# Patient Record
Sex: MALE | Race: AMERICAN INDIAN OR ALASKA NATIVE | NOT HISPANIC OR LATINO | Employment: OTHER | URBAN - NONMETROPOLITAN AREA
[De-identification: names, ages, dates, MRNs, and addresses within clinical notes are randomized per-mention and may not be internally consistent; named-entity substitution may affect disease eponyms.]

---

## 2019-09-26 ENCOUNTER — OFFICE VISIT (OUTPATIENT)
Dept: MEDICAL GROUP | Facility: PHYSICIAN GROUP | Age: 79
End: 2019-09-26
Payer: MEDICARE

## 2019-09-26 VITALS
TEMPERATURE: 97 F | OXYGEN SATURATION: 98 % | DIASTOLIC BLOOD PRESSURE: 86 MMHG | BODY MASS INDEX: 35.02 KG/M2 | SYSTOLIC BLOOD PRESSURE: 120 MMHG | HEIGHT: 70 IN | HEART RATE: 60 BPM | WEIGHT: 244.6 LBS | RESPIRATION RATE: 16 BRPM

## 2019-09-26 DIAGNOSIS — I25.10 CORONARY ARTERY DISEASE INVOLVING NATIVE CORONARY ARTERY OF NATIVE HEART WITHOUT ANGINA PECTORIS: ICD-10-CM

## 2019-09-26 DIAGNOSIS — E66.9 OBESITY (BMI 35.0-39.9 WITHOUT COMORBIDITY): ICD-10-CM

## 2019-09-26 DIAGNOSIS — H40.9 GLAUCOMA OF LEFT EYE, UNSPECIFIED GLAUCOMA TYPE: ICD-10-CM

## 2019-09-26 DIAGNOSIS — H25.093 AGE-RELATED INCIPIENT CATARACT OF BOTH EYES: ICD-10-CM

## 2019-09-26 PROBLEM — H25.9 AGE-RELATED CATARACT OF LEFT EYE: Status: ACTIVE | Noted: 2019-09-26

## 2019-09-26 PROBLEM — H26.9 CATARACT OF BOTH EYES: Status: ACTIVE | Noted: 2019-09-26

## 2019-09-26 PROCEDURE — 99204 OFFICE O/P NEW MOD 45 MIN: CPT | Performed by: INTERNAL MEDICINE

## 2019-09-26 RX ORDER — ASPIRIN 81 MG/1
81 TABLET ORAL DAILY
Qty: 90 TAB | Refills: 3 | COMMUNITY
Start: 2019-09-26

## 2019-09-26 RX ORDER — METOPROLOL SUCCINATE 50 MG/1
50 TABLET, EXTENDED RELEASE ORAL DAILY
COMMUNITY
End: 2019-11-07

## 2019-09-26 RX ORDER — ATORVASTATIN CALCIUM 40 MG/1
40 TABLET, FILM COATED ORAL NIGHTLY
COMMUNITY
End: 2019-11-07 | Stop reason: SDUPTHER

## 2019-09-26 RX ORDER — VITS A,C,E/LUTEIN/MINERALS 300MCG-200
1 TABLET ORAL DAILY
COMMUNITY

## 2019-09-26 ASSESSMENT — PATIENT HEALTH QUESTIONNAIRE - PHQ9: CLINICAL INTERPRETATION OF PHQ2 SCORE: 0

## 2019-09-26 NOTE — LETTER
[employer]  Afua Solorio M.D.  [address] [facility] [address]  [facility] [address]  Fax: [phone]   Authorization for Release/Disclosure of   Protected Health Information   Name: KYLE FLEMING : 1940 SSN: [id number]   Address: [address] [place] [address]  [place] [address] Phone:    [phone] (home)    I authorize the entity listed below to release/disclose the PHI below to:   [employer]/Afua Solorio M.D. and Afua Solorio M.D.   Provider or Entity Name:     Address   City, State, Zip   Phone:      Fax:     Reason for request: continuity of care   Information to be released:    [  ] LAST COLONOSCOPY,  including any PATH REPORT and follow-up  [  ] LAST FIT/COLOGUARD RESULT [  ] LAST DEXA  [  ] LAST MAMMOGRAM  [  ] LAST PAP  [  ] LAST LABS [  ] RETINA EXAM REPORT  [  ] IMMUNIZATION RECORDS  [  ] Release all info      [  ] Check here and initial the line next to each item to release ALL health information INCLUDING  _____ Care and treatment for drug and / or alcohol abuse  _____ HIV testing, infection status, or AIDS  _____ Genetic Testing    DATES OF SERVICE OR TIME PERIOD TO BE DISCLOSED: _____________  I understand and acknowledge that:  * This Authorization may be revoked at any time by you in writing, except if your health information has already been used or disclosed.  * Your health information that will be used or disclosed as a result of you signing this authorization could be re-disclosed by the recipient. If this occurs, your re-disclosed health information may no longer be protected by State or Federal laws.  * You may refuse to sign this Authorization. Your refusal will not affect your ability to obtain treatment.  * This Authorization becomes effective upon signing and will  on (date) __________.      If no date is indicated, this Authorization will  one (1) year from the signature date.    Name: Kyle Fleming    Signature:   Date:     2019            PLEASE FAX REQUESTED RECORDS BACK TO: [phone]

## 2019-09-26 NOTE — PROGRESS NOTES
New Patient to Establish    Reason to establish: New patient to establish    Kyle Fleming is a 79 y.o. male who presents today with the following:    CC:   Chief Complaint   Patient presents with   • Establish Care     referral to Cardiology       HPI:     Cataract of both eyes  Pending eye surgery left one 010/1/2019 and right one one week after that. Seeing Dr. Rod, ophthalmology      Glaucoma of left eye  Using eye drop. Following with Dr. Rod.       Coronary artery disease involving native coronary artery of native heart without angina pectoris  CAD s/p JOSSELINE (Everolimus eluting) in March 2018. On Aspirin, Brilinta, metoprolol, statin   No chest pain, SOB, palpitation  Referral to cardiology in [place]  Request records from Dr. Red in [place], [place]      Obesity (BMI 35.0-39.9 without comorbidity) ([facility])  Body mass index is 35.1 kg/m².  Lifestyle modification          Current Outpatient Medications:   •  travoprost (TRAVATAN Z) 0.004 % Solution, Place 1 Drop in both eyes every evening., Disp: , Rfl:   •  metoprolol SR (TOPROL XL) 50 MG TABLET SR 24 HR, Take 50 mg by mouth every day., Disp: , Rfl:   •  atorvastatin (LIPITOR) 40 MG Tab, Take 40 mg by mouth every evening., Disp: , Rfl:   •  ticagrelor (BRILINTA) 90 MG Tab tablet, Take 90 mg by mouth 2 Times a Day., Disp: , Rfl:   •  Multiple Vitamins-Minerals (OCUVITE-LUTEIN) Tab, Take 1 tablet by mouth every day., Disp: , Rfl:   •  Cholecalciferol (VITAMIN D3) 3000 units Tab, Take  by mouth., Disp: , Rfl:   •  aspirin 81 MG EC tablet, Take 1 Tab by mouth every day., Disp: 90 Tab, Rfl: 3    Allergies, past medical history, past surgical history, medications, family history, social history reviewed and updated.    ROS     Constitutional: Denies fevers or chills  Eyes: Denies changes in vision  Ears/Nose/Throat/Mouth: Denies nasal congestion or sore throat   Cardiovascular: Denies chest pain or palpitations   Respiratory: Denies shortness of breath , Denies  "cough  Gastrointestinal/Hepatic: Denies abd pain, nausea, vomiting   Genitourinary: Denies dysuria or frequency  Musculoskeletal/Rheum: chronic back pain  Neurological: Denies headache  Psychiatric: Denies mood disorder   Endocrine: Denies hx of diabetes or thyroid dysfunction  Heme/Oncology/Lymph Nodes: Denies weight changes or enlarged LNs.    Physical Exam  /86 (BP Location: Left arm, Patient Position: Sitting, BP Cuff Size: Large adult)   Pulse 60   Temp 36.1 °C (97 °F) (Temporal)   Resp 16   Ht 1.778 m (5' 10\")   Wt 110.9 kg (244 lb 9.6 oz)   SpO2 98%   BMI 35.10 kg/m²   General: Normal appearance.  Well developed, well nourished, no acute distress.  HEENT: Normocephalic.  Extraocular motion intact. Pupils are equally round, reactive to light and accommodation, conjunctiva clear, no scleral icterus.  Ears: normal shape and contour, ear canals clear, tympanic membranes intact. Hearing intact.  Oropharynx clear, no erythema, edema or exudate noted.  NECK: Thyroid is not enlarged. No JVD.  No carotid bruits. No masses.  Cardiovascular: Regular rhythm and rate. No murmur/rubs/gallops.   Respiratory: Normal respiratory effort, clear to auscultation bilaterally. No wheezing/rales/rhonchi.    Abdomen: Bowel sounds present, soft, nontender, nondistended, no rebound, no guarding. No hepatosplenomegaly.  : No suprapubic tenderness. No CVA tenderness.   EXT: no LE edema b/l. No cyanosis.  No clubbing.  Lymph: No cervical, supraclavicular or axillary lymph nodes are palpable  Skin: Warm and dry.  No suspicious lesions or rashes.   Neurologic: No focal deficits. Sensation intact.    Psych: AAOx3,  Normal mood and affect, normal judgment and insight, memory within normal limits      Assessment and Plan    Kyle was seen today for establish care.    Diagnoses and all orders for this visit:    Coronary artery disease involving native coronary artery of native heart without angina pectoris  -     REFERRAL TO " CARDIOLOGY  -     CBC WITH DIFFERENTIAL; Future  -     Comp Metabolic Panel; Future  -     Lipid Profile; Future  -     TSH WITH REFLEX TO FT4; Future  Continue current regimen    Obesity (BMI 35.0-39.9 without comorbidity)  -     Patient identified as having weight management issue.  Appropriate orders and counseling given.  -     TSH WITH REFLEX TO FT4; Future    Age-related incipient cataract of both eyes  Glaucoma of left eye, unspecified glaucoma type  Follow with ophthalmology    Request records from prior PCP, cardiology, ophthalmology    Follow-up:Return in about 2 weeks (around 10/10/2019), or if symptoms worsen or fail to improve.    This note was created using voice recognition software. There may be unintended errors in spelling, grammar or content.

## 2019-09-26 NOTE — ASSESSMENT & PLAN NOTE
CAD s/p JOSSELINE (Everolimus eluting) in March 2018. On Aspirin, Brilinta, metoprolol, statin   No chest pain, SOB, palpitation  Referral to cardiology in [place]  Request records from Dr. Red in [place], [place]

## 2019-09-26 NOTE — LETTER
[employer]  Afua Solorio M.D.  [address] [facility] [address]  [facility] [address]  Fax: [phone]   Authorization for Release/Disclosure of   Protected Health Information   Name: KYLE FLEMING : 1940 SSN: [id number]   Address: [address] [place] [address]  [place] [address] Phone:    [phone] (home)    I authorize the entity listed below to release/disclose the PHI below to:   [employer]/Afua Solorio M.D. and Afua Solorio M.D.   Provider or Entity Name:     Address   City, State, Zip   Phone:      Fax:     Reason for request: continuity of care   Information to be released:    [  ] LAST COLONOSCOPY,  including any PATH REPORT and follow-up  [  ] LAST FIT/COLOGUARD RESULT [  ] LAST DEXA  [  ] LAST MAMMOGRAM  [  ] LAST PAP  [  ] LAST LABS [  ] RETINA EXAM REPORT  [  ] IMMUNIZATION RECORDS  [  ] Release all info      [  ] Check here and initial the line next to each item to release ALL health information INCLUDING  _____ Care and treatment for drug and / or alcohol abuse  _____ HIV testing, infection status, or AIDS  _____ Genetic Testing    DATES OF SERVICE OR TIME PERIOD TO BE DISCLOSED: _____________  I understand and acknowledge that:  * This Authorization may be revoked at any time by you in writing, except if your health information has already been used or disclosed.  * Your health information that will be used or disclosed as a result of you signing this authorization could be re-disclosed by the recipient. If this occurs, your re-disclosed health information may no longer be protected by State or Federal laws.  * You may refuse to sign this Authorization. Your refusal will not affect your ability to obtain treatment.  * This Authorization becomes effective upon signing and will  on (date) __________.      If no date is indicated, this Authorization will  one (1) year from the signature date.    Name: Kyle Fleming    Signature:   Date:     2019            PLEASE FAX REQUESTED RECORDS BACK TO: [phone]

## 2019-09-26 NOTE — LETTER
[employer]  Afua Solorio M.D.  [address] [facility] [address]  [facility] [address]  Fax: [phone]   Authorization for Release/Disclosure of   Protected Health Information   Name: KYLE FLEMING : 1940 SSN: [id number]   Address: [address] [place] [address]  [place] [address] Phone:    [phone] (home)    I authorize the entity listed below to release/disclose the PHI below to:   [employer]/Afua Solorio M.D. and Afua Solorio M.D.   Provider or Entity Name:     Address   City, State, Zip   Phone:      Fax:     Reason for request: continuity of care   Information to be released:    [  ] LAST COLONOSCOPY,  including any PATH REPORT and follow-up  [  ] LAST FIT/COLOGUARD RESULT [  ] LAST DEXA  [  ] LAST MAMMOGRAM  [  ] LAST PAP  [  ] LAST LABS [  ] RETINA EXAM REPORT  [  ] IMMUNIZATION RECORDS  [  ] Release all info      [  ] Check here and initial the line next to each item to release ALL health information INCLUDING  _____ Care and treatment for drug and / or alcohol abuse  _____ HIV testing, infection status, or AIDS  _____ Genetic Testing    DATES OF SERVICE OR TIME PERIOD TO BE DISCLOSED: _____________  I understand and acknowledge that:  * This Authorization may be revoked at any time by you in writing, except if your health information has already been used or disclosed.  * Your health information that will be used or disclosed as a result of you signing this authorization could be re-disclosed by the recipient. If this occurs, your re-disclosed health information may no longer be protected by State or Federal laws.  * You may refuse to sign this Authorization. Your refusal will not affect your ability to obtain treatment.  * This Authorization becomes effective upon signing and will  on (date) __________.      If no date is indicated, this Authorization will  one (1) year from the signature date.    Name: Kyle Fleming    Signature:   Date:     2019            PLEASE FAX REQUESTED RECORDS BACK TO: [phone]

## 2019-09-26 NOTE — LETTER
[employer]  Afua Solorio M.D.  [address] [facility] [address]  [facility] [address]  Fax: [phone]   Authorization for Release/Disclosure of   Protected Health Information   Name: YKLE FLEMING : 1940 SSN: [id number]   Address: [address] [place] [address]  [place] [address] Phone:    [phone] (home)    I authorize the entity listed below to release/disclose the PHI below to:   [employer]/Afua Solorio M.D. and Afua Solorio M.D.   Provider or Entity Name:     Address   City, State, Zip   Phone:      Fax:     Reason for request: continuity of care   Information to be released:    [  ] LAST COLONOSCOPY,  including any PATH REPORT and follow-up  [  ] LAST FIT/COLOGUARD RESULT [  ] LAST DEXA  [  ] LAST MAMMOGRAM  [  ] LAST PAP  [  ] LAST LABS [  ] RETINA EXAM REPORT  [  ] IMMUNIZATION RECORDS  [  ] Release all info      [  ] Check here and initial the line next to each item to release ALL health information INCLUDING  _____ Care and treatment for drug and / or alcohol abuse  _____ HIV testing, infection status, or AIDS  _____ Genetic Testing    DATES OF SERVICE OR TIME PERIOD TO BE DISCLOSED: _____________  I understand and acknowledge that:  * This Authorization may be revoked at any time by you in writing, except if your health information has already been used or disclosed.  * Your health information that will be used or disclosed as a result of you signing this authorization could be re-disclosed by the recipient. If this occurs, your re-disclosed health information may no longer be protected by State or Federal laws.  * You may refuse to sign this Authorization. Your refusal will not affect your ability to obtain treatment.  * This Authorization becomes effective upon signing and will  on (date) __________.      If no date is indicated, this Authorization will  one (1) year from the signature date.    Name: Kyle Fleming    Signature:   Date:     2019            PLEASE FAX REQUESTED RECORDS BACK TO: [phone]

## 2019-09-27 ENCOUNTER — HOSPITAL ENCOUNTER (OUTPATIENT)
Dept: LAB | Facility: MEDICAL CENTER | Age: 79
End: 2019-09-27
Attending: INTERNAL MEDICINE
Payer: MEDICARE

## 2019-09-27 DIAGNOSIS — E66.9 OBESITY (BMI 35.0-39.9 WITHOUT COMORBIDITY): ICD-10-CM

## 2019-09-27 DIAGNOSIS — I25.10 CORONARY ARTERY DISEASE INVOLVING NATIVE CORONARY ARTERY OF NATIVE HEART WITHOUT ANGINA PECTORIS: ICD-10-CM

## 2019-09-27 LAB
ALBUMIN SERPL BCP-MCNC: 4.7 G/DL (ref 3.2–4.9)
ALBUMIN/GLOB SERPL: 1.9 G/DL
ALP SERPL-CCNC: 52 U/L (ref 30–99)
ALT SERPL-CCNC: 15 U/L (ref 2–50)
ANION GAP SERPL CALC-SCNC: 10 MMOL/L (ref 0–11.9)
AST SERPL-CCNC: 18 U/L (ref 12–45)
BASOPHILS # BLD AUTO: 0.7 % (ref 0–1.8)
BASOPHILS # BLD: 0.04 K/UL (ref 0–0.12)
BILIRUB SERPL-MCNC: 0.9 MG/DL (ref 0.1–1.5)
BUN SERPL-MCNC: 19 MG/DL (ref 8–22)
CALCIUM SERPL-MCNC: 9.5 MG/DL (ref 8.5–10.5)
CHLORIDE SERPL-SCNC: 107 MMOL/L (ref 96–112)
CHOLEST SERPL-MCNC: 157 MG/DL (ref 100–199)
CO2 SERPL-SCNC: 24 MMOL/L (ref 20–33)
CREAT SERPL-MCNC: 0.95 MG/DL (ref 0.5–1.4)
EOSINOPHIL # BLD AUTO: 0.22 K/UL (ref 0–0.51)
EOSINOPHIL NFR BLD: 4 % (ref 0–6.9)
ERYTHROCYTE [DISTWIDTH] IN BLOOD BY AUTOMATED COUNT: 48.8 FL (ref 35.9–50)
FASTING STATUS PATIENT QL REPORTED: NORMAL
GLOBULIN SER CALC-MCNC: 2.5 G/DL (ref 1.9–3.5)
GLUCOSE SERPL-MCNC: 106 MG/DL (ref 65–99)
HCT VFR BLD AUTO: 43.8 % (ref 42–52)
HDLC SERPL-MCNC: 43 MG/DL
HGB BLD-MCNC: 14.3 G/DL (ref 14–18)
IMM GRANULOCYTES # BLD AUTO: 0.04 K/UL (ref 0–0.11)
IMM GRANULOCYTES NFR BLD AUTO: 0.7 % (ref 0–0.9)
LDLC SERPL CALC-MCNC: 80 MG/DL
LYMPHOCYTES # BLD AUTO: 1.49 K/UL (ref 1–4.8)
LYMPHOCYTES NFR BLD: 26.8 % (ref 22–41)
MCH RBC QN AUTO: 31.6 PG (ref 27–33)
MCHC RBC AUTO-ENTMCNC: 32.6 G/DL (ref 33.7–35.3)
MCV RBC AUTO: 96.9 FL (ref 81.4–97.8)
MONOCYTES # BLD AUTO: 0.61 K/UL (ref 0–0.85)
MONOCYTES NFR BLD AUTO: 11 % (ref 0–13.4)
NEUTROPHILS # BLD AUTO: 3.16 K/UL (ref 1.82–7.42)
NEUTROPHILS NFR BLD: 56.8 % (ref 44–72)
NRBC # BLD AUTO: 0 K/UL
NRBC BLD-RTO: 0 /100 WBC
PLATELET # BLD AUTO: 194 K/UL (ref 164–446)
PMV BLD AUTO: 11.7 FL (ref 9–12.9)
POTASSIUM SERPL-SCNC: 4.1 MMOL/L (ref 3.6–5.5)
PROT SERPL-MCNC: 7.2 G/DL (ref 6–8.2)
RBC # BLD AUTO: 4.52 M/UL (ref 4.7–6.1)
SODIUM SERPL-SCNC: 141 MMOL/L (ref 135–145)
TRIGL SERPL-MCNC: 170 MG/DL (ref 0–149)
TSH SERPL DL<=0.005 MIU/L-ACNC: 3.67 UIU/ML (ref 0.38–5.33)
WBC # BLD AUTO: 5.6 K/UL (ref 4.8–10.8)

## 2019-09-27 PROCEDURE — 36415 COLL VENOUS BLD VENIPUNCTURE: CPT

## 2019-09-27 PROCEDURE — 80053 COMPREHEN METABOLIC PANEL: CPT

## 2019-09-27 PROCEDURE — 85025 COMPLETE CBC W/AUTO DIFF WBC: CPT

## 2019-09-27 PROCEDURE — 84443 ASSAY THYROID STIM HORMONE: CPT

## 2019-09-27 PROCEDURE — 80061 LIPID PANEL: CPT

## 2019-10-11 ENCOUNTER — OFFICE VISIT (OUTPATIENT)
Dept: MEDICAL GROUP | Facility: PHYSICIAN GROUP | Age: 79
End: 2019-10-11
Payer: MEDICARE

## 2019-10-11 ENCOUNTER — TELEPHONE (OUTPATIENT)
Dept: CARDIOLOGY | Facility: MEDICAL CENTER | Age: 79
End: 2019-10-11

## 2019-10-11 ENCOUNTER — HOSPITAL ENCOUNTER (OUTPATIENT)
Facility: MEDICAL CENTER | Age: 79
End: 2019-10-11
Attending: INTERNAL MEDICINE
Payer: MEDICARE

## 2019-10-11 VITALS
TEMPERATURE: 97 F | DIASTOLIC BLOOD PRESSURE: 88 MMHG | WEIGHT: 205 LBS | HEART RATE: 87 BPM | OXYGEN SATURATION: 98 % | SYSTOLIC BLOOD PRESSURE: 145 MMHG | HEIGHT: 70 IN | BODY MASS INDEX: 29.35 KG/M2 | RESPIRATION RATE: 16 BRPM

## 2019-10-11 DIAGNOSIS — R73.9 HYPERGLYCEMIA: ICD-10-CM

## 2019-10-11 DIAGNOSIS — E78.49 OTHER HYPERLIPIDEMIA: ICD-10-CM

## 2019-10-11 DIAGNOSIS — E66.3 OVERWEIGHT: ICD-10-CM

## 2019-10-11 DIAGNOSIS — I25.10 CORONARY ARTERY DISEASE INVOLVING NATIVE CORONARY ARTERY OF NATIVE HEART WITHOUT ANGINA PECTORIS: ICD-10-CM

## 2019-10-11 DIAGNOSIS — H25.091 AGE-RELATED INCIPIENT CATARACT OF RIGHT EYE: ICD-10-CM

## 2019-10-11 DIAGNOSIS — I10 ESSENTIAL HYPERTENSION: ICD-10-CM

## 2019-10-11 PROBLEM — H40.9 GLAUCOMA OF LEFT EYE: Status: RESOLVED | Noted: 2019-09-26 | Resolved: 2019-10-11

## 2019-10-11 PROCEDURE — 36415 COLL VENOUS BLD VENIPUNCTURE: CPT

## 2019-10-11 PROCEDURE — 99214 OFFICE O/P EST MOD 30 MIN: CPT | Performed by: INTERNAL MEDICINE

## 2019-10-11 PROCEDURE — 83036 HEMOGLOBIN GLYCOSYLATED A1C: CPT

## 2019-10-11 NOTE — ASSESSMENT & PLAN NOTE
Ref. Range 9/27/2019 08:35   Glucose Latest Ref Range: 65 - 99 mg/dL 106 (H)   Lifestyle modification  Check A1c

## 2019-10-11 NOTE — TELEPHONE ENCOUNTER
I left a message for the patient regarding time change for appt w/ [facility] on 10/14/2019 at 2pm. Change made per [facility], due to hospital procedure.

## 2019-10-11 NOTE — PROGRESS NOTES
Established Patient    Kyle Fleming is a 79 y.o. male who presents today with the following:    CC:   Chief Complaint   Patient presents with   • Follow-Up     Lab results       HPI:     Age-related cataract of right eye  Pending right eye surgery. Seeing Dr. Rod, ophthalmology      Coronary artery disease involving native coronary artery of native heart without angina pectoris  CAD s/p JOSSELINE (Everolimus eluting) in March 2018. On Aspirin, Brilinta, metoprolol, statin   No chest pain, SOB, palpitation  Pending cardiology appointment  Request records from Dr. Red in [place], [place]        Essential hypertension  Not well controlled today. He will monitor BP BID and follow with us in two weeks. On metoprolol. Cardiac diet       Hyperglycemia     Ref. Range 9/27/2019 08:35   Glucose Latest Ref Range: 65 - 99 mg/dL 106 (H)   Lifestyle modification  Check A1c      Other hyperlipidemia  The 10-year ASCVD risk score (Pedro DELA CRUZ Jr., et al., 2013) is: 37.4%    Values used to calculate the score:      Age: 79 years      Sex: Male      Is Non- : No      Diabetic: No      Tobacco smoker: No      Systolic Blood Pressure: 145 mmHg      Is BP treated: Yes      HDL Cholesterol: 43 mg/dL      Total Cholesterol: 157 mg/dL  On statin  Lifestyle modification        Current Outpatient Medications   Medication Sig Dispense Refill   • travoprost (TRAVATAN Z) 0.004 % Solution Place 1 Drop in both eyes every evening.     • metoprolol SR (TOPROL XL) 50 MG TABLET SR 24 HR Take 50 mg by mouth every day.     • atorvastatin (LIPITOR) 40 MG Tab Take 40 mg by mouth every evening.     • ticagrelor (BRILINTA) 90 MG Tab tablet Take 90 mg by mouth 2 Times a Day.     • Multiple Vitamins-Minerals (OCUVITE-LUTEIN) Tab Take 1 tablet by mouth every day.     • Cholecalciferol (VITAMIN D3) 3000 units Tab Take  by mouth.     • aspirin 81 MG EC tablet Take 1 Tab by mouth every day. 90 Tab 3     No current facility-administered  "medications for this visit.        Allergies, past medical history, past surgical history, medications, family history, social history reviewed and updated.    ROS   Constitutional: Denies fevers or chills  Eyes: Denies changes in vision  Ears/Nose/Throat/Mouth: Denies nasal congestion or sore throat   Cardiovascular: Denies chest pain or palpitations   Respiratory: Denies shortness of breath , Denies cough  Gastrointestinal/Hepatic: Denies abd pain, nausea, vomiting   Genitourinary: Denies dysuria or frequency  Musculoskeletal/Rheum: Denies joint pain and swelling   Neurological: Denies headache  Psychiatric: Denies mood disorder   Endocrine: Denies hx of diabetes or thyroid dysfunction  Heme/Oncology/Lymph Nodes: Denies weight changes or enlarged LNs.    Physical Exam  Vitals: /88 (BP Location: Right arm, Patient Position: Sitting, BP Cuff Size: Large adult)   Pulse 87   Temp 36.1 °C (97 °F) (Temporal)   Resp 16   Ht 1.778 m (5' 10\")   Wt 93 kg (205 lb)   SpO2 98%   BMI 29.41 kg/m²   General: Alert, pleasant, NAD  HEENT: Normocephalic.  EOMI, no icterus or pallor.  Conjunctivae and lids normal. External ears normal. Oropharynx non-erythematous, mucous membranes moist.  Neck supple.  No thyromegaly or masses palpated.   Lymph: No cervical or supraclavicular lymphadenopathy.  Cardiovascular: Regular rate and rhythm.   Respiratory: Normal respiratory effort.  Clear to auscultation bilaterally.  Abdomen: Non-distended, soft  Skin: Warm, dry, no rashes.  Musculoskeletal: Gait is normal.  Moves all extremities well.  Extremities: No leg edema.  radial pulses 2+ symmetric.   Psych:  Affect/mood is normal, judgement is good, memory is intact, grooming is appropriate.      Labs (9/27/19) were reviewed and discussed with patients.  All questions were answered.      Assessment and Plan    Kyle was seen today for follow-up.    Diagnoses and all orders for this visit:    Coronary artery disease involving native " coronary artery of native heart without angina pectoris  Continue current regimen  Follow up with cardiology    Age-related cataract of right eye  Pending eye surgery    Overweight  Lifestyle modification    Hyperglycemia  -     HEMOGLOBIN A1C; Future  Lifestyle modification    Essential hypertension  DASH diet, monitor BP BID  Titrate BP med as appropriate    Other hyperlipidemia  statin        Follow-up:Return in about 2 weeks (around 10/25/2019), or if symptoms worsen or fail to improve.    This note was created using voice recognition software. There may be unintended errors in spelling, grammar or content.

## 2019-10-11 NOTE — ASSESSMENT & PLAN NOTE
Not well controlled today. He will monitor BP BID and follow with us in two weeks. On metoprolol. Cardiac diet

## 2019-10-11 NOTE — ASSESSMENT & PLAN NOTE
The 10-year ASCVD risk score (Pedro DELA CRUZ Jr., et al., 2013) is: 37.4%    Values used to calculate the score:      Age: 79 years      Sex: Male      Is Non- : No      Diabetic: No      Tobacco smoker: No      Systolic Blood Pressure: 145 mmHg      Is BP treated: Yes      HDL Cholesterol: 43 mg/dL      Total Cholesterol: 157 mg/dL  On statin  Lifestyle modification

## 2019-10-11 NOTE — ASSESSMENT & PLAN NOTE
CAD s/p JOSSELINE (Everolimus eluting) in March 2018. On Aspirin, Brilinta, metoprolol, statin   No chest pain, SOB, palpitation  Pending cardiology appointment  Request records from Dr. Red in [place], [place]

## 2019-10-12 LAB
EST. AVERAGE GLUCOSE BLD GHB EST-MCNC: 114 MG/DL
HBA1C MFR BLD: 5.6 % (ref 0–5.6)

## 2019-10-15 ENCOUNTER — TELEPHONE (OUTPATIENT)
Dept: MEDICAL GROUP | Facility: PHYSICIAN GROUP | Age: 79
End: 2019-10-15

## 2019-10-15 NOTE — TELEPHONE ENCOUNTER
----- Message from Afua Solorio M.D. sent at 10/14/2019  7:17 AM PDT -----  Please call patient and let patient know that her glycohemoglobin (three-month average of blood sugar) is normal.     Thanks!

## 2019-11-01 ENCOUNTER — APPOINTMENT (OUTPATIENT)
Dept: MEDICAL GROUP | Facility: PHYSICIAN GROUP | Age: 79
End: 2019-11-01
Payer: MEDICARE

## 2019-11-05 ENCOUNTER — TELEPHONE (OUTPATIENT)
Dept: CARDIOLOGY | Facility: MEDICAL CENTER | Age: 79
End: 2019-11-05

## 2019-11-05 NOTE — TELEPHONE ENCOUNTER
Spoke with pt about prior cardio, Pt stated he seen Dr Red in [facility].  Will try and get those records prior to appt.

## 2019-11-07 ENCOUNTER — OFFICE VISIT (OUTPATIENT)
Dept: CARDIOLOGY | Facility: MEDICAL CENTER | Age: 79
End: 2019-11-07
Payer: MEDICARE

## 2019-11-07 VITALS
HEIGHT: 70 IN | OXYGEN SATURATION: 96 % | SYSTOLIC BLOOD PRESSURE: 134 MMHG | HEART RATE: 82 BPM | BODY MASS INDEX: 35.82 KG/M2 | DIASTOLIC BLOOD PRESSURE: 70 MMHG | WEIGHT: 250.22 LBS

## 2019-11-07 DIAGNOSIS — I25.119 CORONARY ARTERY DISEASE WITH ANGINA PECTORIS, UNSPECIFIED VESSEL OR LESION TYPE, UNSPECIFIED WHETHER NATIVE OR TRANSPLANTED HEART (HCC): ICD-10-CM

## 2019-11-07 DIAGNOSIS — I10 ESSENTIAL HYPERTENSION: ICD-10-CM

## 2019-11-07 LAB — EKG IMPRESSION: NORMAL

## 2019-11-07 PROCEDURE — 93000 ELECTROCARDIOGRAM COMPLETE: CPT | Performed by: INTERNAL MEDICINE

## 2019-11-07 PROCEDURE — 99204 OFFICE O/P NEW MOD 45 MIN: CPT | Performed by: INTERNAL MEDICINE

## 2019-11-07 RX ORDER — NITROGLYCERIN 0.4 MG/1
0.4 TABLET SUBLINGUAL
COMMUNITY
End: 2019-11-08

## 2019-11-07 RX ORDER — ATORVASTATIN CALCIUM 80 MG/1
80 TABLET, FILM COATED ORAL DAILY
Qty: 90 TAB | Refills: 3 | Status: SHIPPED | OUTPATIENT
Start: 2019-11-07 | End: 2020-02-11 | Stop reason: SDUPTHER

## 2019-11-07 RX ORDER — KETOROLAC TROMETHAMINE 5 MG/ML
SOLUTION OPHTHALMIC
Refills: 0 | COMMUNITY
Start: 2019-10-09 | End: 2020-02-11

## 2019-11-07 NOTE — PROGRESS NOTES
CARDIOLOGY NEW PATIENT CONSULTATION    PCP: Afua Solorio M.D.    1. Coronary artery disease with angina pectoris, unspecified vessel or lesion type, unspecified whether native or transplanted heart (HCC)  PCI to mid LAD 3/20/2018 after presenting with syncope.  -Brilinta is no longer needed and was stopped  -Metoprolol is no longer needed and this was stopped by weaning  -Continue aspirin and statin; atorvastatin increased to 80 mg    2. Essential hypertension  Currently well controlled  - I stop metoprolol which may be driving some of the fatigue symptoms.  - He will see Dr. Solorio next week to assess blood pressure response as well as symptom response.  If antihypertensives are needed I would favor more conventional first-line agents like thiazides, calcium channel blockers or ACE inhibitors.      Follow up with Douglas Whittaker M.D. in 1 year     Chief Complaint   Patient presents with   • Coronary Artery Disease       History: Kyle Fleming is a 79 y.o. male with a past medical history of coronary artery disease presenting for consultation regarding cardiovascular disease.  In 2018 he had 2 syncopal episodes which are suggestive of cardiac syncope.  He underwent cardiac evaluation and ultimately received a drug-eluting stent to the mid LAD.  He has been taking Brilinta and aspirin since that time.  He is bothered by the high co-pays as well as easy bruising.  He was initially told the medication really only be needed for 1 year and wonders if it would be appropriate to stop this.  He also reports feeling out of energy lately and needs to take naps.  His wife does report a pattern of nocturnal apnea/hypopnia.  He has been under the care of Dr. Solorio regarding blood pressure with average readings around 125-130 systolic.    ROS:   All other systems reviewed and negative except as per the HPI    PE:  /70 (BP Location: Left arm, Patient Position: Sitting, BP Cuff Size: Adult)   Pulse 82   Ht 1.778 m (5'  "10\")   Wt 113.5 kg (250 lb 3.6 oz)   SpO2 96%   BMI 35.90 kg/m²   GEN: Well appearing  HEENT: Symmetric face. Anicteric sclerae. Moist mucus membranes  NECK: No JVD. No lymphadenopathy  CARDIAC: Normal PMI,  regular, normal S1, S2.   VASCULATURE: Normal carotid amplitude without bruit.   RESP: Clear to auscultation bilaterally  ABD: Soft, non-tender, non-distended  EXT: No  edema, no clubbing or cyanosis  SKIN: Warm and dry  NEURO: No gross deficits   PSYCH: Appropriate affect, participates in conversation    History reviewed. No pertinent past medical history.  Past Surgical History:   Procedure Laterality Date   • OTHER  2018    left shoulder rotator cuff repair   • LAMINOTOMY  2011    laminectomy, fixation  hardware. MRI compatible   • EYE SURGERY Left     left glaucoma and cataract surgery 10/2019     Allergies   Allergen Reactions   • Ciprofloxacin Hcl Rash     rash     Outpatient Encounter Medications as of 11/7/2019   Medication Sig Dispense Refill   • nitroglycerin (NITROSTAT) 0.4 MG SL Tab Place 0.4 mg under tongue every 5 minutes as needed for Chest Pain.     • atorvastatin (LIPITOR) 80 MG tablet Take 1 Tab by mouth every day. 90 Tab 3   • Multiple Vitamins-Minerals (OCUVITE-LUTEIN) Tab Take 1 tablet by mouth every day.     • Cholecalciferol (VITAMIN D3) 3000 units Tab Take  by mouth.     • aspirin 81 MG EC tablet Take 1 Tab by mouth every day. 90 Tab 3   • ketorolac (ACULAR) 0.5 % Solution INSTILL 1 GTT INTO AFFECTED EYE QID  0   • [DISCONTINUED] travoprost (TRAVATAN Z) 0.004 % Solution Place 1 Drop in both eyes every evening.     • [DISCONTINUED] metoprolol SR (TOPROL XL) 50 MG TABLET SR 24 HR Take 50 mg by mouth every day.     • [DISCONTINUED] atorvastatin (LIPITOR) 40 MG Tab Take 40 mg by mouth every evening.     • [DISCONTINUED] ticagrelor (BRILINTA) 90 MG Tab tablet Take 90 mg by mouth 2 Times a Day.       No facility-administered encounter medications on file as of 11/7/2019.      History " reviewed. No pertinent family history.     Social History     Socioeconomic History   • Marital status:      Spouse name: Not on file   • Number of children: Not on file   • Years of education: Not on file   • Highest education level: Not on file   Occupational History   • Not on file   Social Needs   • Financial resource strain: Not on file   • Food insecurity:     Worry: Not on file     Inability: Not on file   • Transportation needs:     Medical: Not on file     Non-medical: Not on file   Tobacco Use   • Smoking status: Never Smoker   • Smokeless tobacco: Never Used   Substance and Sexual Activity   • Alcohol use: Yes     Comment: wine   • Drug use: Never   • Sexual activity: Not Currently   Lifestyle   • Physical activity:     Days per week: Not on file     Minutes per session: Not on file   • Stress: Not on file   Relationships   • Social connections:     Talks on phone: Not on file     Gets together: Not on file     Attends Christian service: Not on file     Active member of club or organization: Not on file     Attends meetings of clubs or organizations: Not on file     Relationship status: Not on file   • Intimate partner violence:     Fear of current or ex partner: Not on file     Emotionally abused: Not on file     Physically abused: Not on file     Forced sexual activity: Not on file   Other Topics Concern   • Not on file   Social History Narrative   • Not on file       Studies  Lab Results   Component Value Date/Time    CHOLSTRLTOT 157 09/27/2019 08:35 AM    LDL 80 09/27/2019 08:35 AM    HDL 43 09/27/2019 08:35 AM    TRIGLYCERIDE 170 (H) 09/27/2019 08:35 AM       Lab Results   Component Value Date/Time    SODIUM 141 09/27/2019 08:35 AM    POTASSIUM 4.1 09/27/2019 08:35 AM    CHLORIDE 107 09/27/2019 08:35 AM    CO2 24 09/27/2019 08:35 AM    GLUCOSE 106 (H) 09/27/2019 08:35 AM    BUN 19 09/27/2019 08:35 AM    CREATININE 0.95 09/27/2019 08:35 AM     Lab Results   Component Value Date/Time     ALKPHOSPHAT 52 09/27/2019 08:35 AM    ASTSGOT 18 09/27/2019 08:35 AM    ALTSGPT 15 09/27/2019 08:35 AM    TBILIRUBIN 0.9 09/27/2019 08:35 AM        For this encounter I directly reviewed ECG tracings and medical records I agree with the interpretations in the electronic health record

## 2019-11-08 ENCOUNTER — OFFICE VISIT (OUTPATIENT)
Dept: MEDICAL GROUP | Facility: PHYSICIAN GROUP | Age: 79
End: 2019-11-08
Payer: MEDICARE

## 2019-11-08 VITALS
BODY MASS INDEX: 35.5 KG/M2 | HEIGHT: 70 IN | TEMPERATURE: 96.4 F | RESPIRATION RATE: 16 BRPM | SYSTOLIC BLOOD PRESSURE: 124 MMHG | OXYGEN SATURATION: 96 % | WEIGHT: 248 LBS | DIASTOLIC BLOOD PRESSURE: 78 MMHG | HEART RATE: 66 BPM

## 2019-11-08 DIAGNOSIS — I10 ESSENTIAL HYPERTENSION: ICD-10-CM

## 2019-11-08 DIAGNOSIS — E66.9 OBESITY (BMI 35.0-39.9 WITHOUT COMORBIDITY): ICD-10-CM

## 2019-11-08 DIAGNOSIS — Z91.89 AT RISK FOR OBSTRUCTIVE SLEEP APNEA: ICD-10-CM

## 2019-11-08 DIAGNOSIS — I25.10 CORONARY ARTERY DISEASE INVOLVING NATIVE CORONARY ARTERY OF NATIVE HEART WITHOUT ANGINA PECTORIS: ICD-10-CM

## 2019-11-08 PROBLEM — R73.9 HYPERGLYCEMIA: Status: RESOLVED | Noted: 2019-10-11 | Resolved: 2019-11-08

## 2019-11-08 PROCEDURE — 99214 OFFICE O/P EST MOD 30 MIN: CPT | Performed by: INTERNAL MEDICINE

## 2019-11-08 RX ORDER — LISINOPRIL 5 MG/1
TABLET ORAL
Refills: 0 | OUTPATIENT
Start: 2019-11-08

## 2019-11-08 RX ORDER — LISINOPRIL 5 MG/1
5 TABLET ORAL DAILY
Qty: 30 TAB | Refills: 0 | Status: SHIPPED | OUTPATIENT
Start: 2019-11-08 | End: 2020-01-06 | Stop reason: SDUPTHER

## 2019-11-08 NOTE — ASSESSMENT & PLAN NOTE
CAD s/p JOSSELINE (Everolimus eluting) in March 2018. On Aspirin, statin. Cardiology discontinued metoprolol.  Will close monitor his BP and titrate BP med.   No chest pain, SOB, palpitation  Following with [facility]ology

## 2019-11-08 NOTE — PROGRESS NOTES
Established Patient    Kyle Fleming is a 79 y.o. male who presents today with the following:    CC:   Chief Complaint   Patient presents with   • Follow-Up     BP        HPI:     Essential hypertension  Not well controlled today.  Patient is tapering off metoprolol. Will add lisinopril. He will monitor BP BID and follow with us in two weeks.      Coronary artery disease involving native coronary artery of native heart without angina pectoris  CAD s/p JOSSELINE (Everolimus eluting) in March 2018. On Aspirin, statin. Cardiology discontinued metoprolol.  Will close monitor his BP and titrate BP med.   No chest pain, SOB, palpitation  Following with [facility]ology      Obesity (BMI 35.0-39.9 without comorbidity)  Body mass index is 35.58 kg/m².  Lifestyle modification      At risk for obstructive sleep apnea  At high risk for ADRIANE. STOP BANG score 6-7.  Referral to sleep studies        Current Outpatient Medications   Medication Sig Dispense Refill   • ketorolac (ACULAR) 0.5 % Solution INSTILL 1 GTT INTO AFFECTED EYE QID  0   • atorvastatin (LIPITOR) 80 MG tablet Take 1 Tab by mouth every day. 90 Tab 3   • Multiple Vitamins-Minerals (OCUVITE-LUTEIN) Tab Take 1 tablet by mouth every day.     • Cholecalciferol (VITAMIN D3) 3000 units Tab Take  by mouth.     • aspirin 81 MG EC tablet Take 1 Tab by mouth every day. 90 Tab 3   • lisinopril (PRINIVIL) 5 MG Tab Take 1 Tab by mouth every day. 30 Tab 0     No current facility-administered medications for this visit.        Allergies, past medical history, past surgical history, medications, family history, social history reviewed and updated.    ROS   Constitutional: Denies fevers or chills  Eyes: Denies changes in vision  Ears/Nose/Throat/Mouth: Denies nasal congestion or sore throat   Cardiovascular: Denies chest pain or palpitations   Respiratory: Denies shortness of breath , Denies cough  Gastrointestinal/Hepatic: Denies abd pain, nausea, vomiting   Genitourinary: Denies  "dysuria or frequency  Musculoskeletal/Rheum: Denies joint pain and swelling   Neurological: Denies headache  Psychiatric: Denies mood disorder   Endocrine: Denies hx of diabetes or thyroid dysfunction  Heme/Oncology/Lymph Nodes: Denies weight changes or enlarged LNs.    Physical Exam  Vitals: /78 (BP Location: Left arm, Patient Position: Sitting, BP Cuff Size: Adult)   Pulse 66   Temp (!) 35.8 °C (96.4 °F) (Temporal)   Resp 16   Ht 1.778 m (5' 10\")   Wt 112.5 kg (248 lb)   SpO2 96%   BMI 35.58 kg/m²   General: Alert, pleasant, NAD  HEENT: Normocephalic.  EOMI, no icterus or pallor.  Conjunctivae and lids normal. External ears normal. Oropharynx non-erythematous, mucous membranes moist.  Neck supple.  No thyromegaly or masses palpated.   Lymph: No cervical or supraclavicular lymphadenopathy.  Cardiovascular: Regular rate and rhythm.  Respiratory: Normal respiratory effort.  Clear to auscultation bilaterally.  Abdomen: Non-distended, soft  Skin: Warm, dry, no rashes.  Musculoskeletal: Gait is normal.  Moves all extremities well.  Extremities: No leg edema.    Psych:  Affect/mood is normal, judgement is good, memory is at baseline forgetful, grooming is appropriate.      Labs (9/27/19, 10/11/19) were reviewed.  Patient notes (11/7/19) were reviewed.   All questions were answered.      Assessment and Plan    Kyle was seen today for follow-up.    Diagnoses and all orders for this visit:    Essential hypertension  Not controlled  Lisinopril added  Monitor BP BID  Low sodium diet    Coronary artery disease involving native coronary artery of native heart without angina pectoris  Aspirin, statin, per cardiology DC metoprolol.  Monitor BP closely and titrate BP accordingly    Obesity (BMI 35.0-39.9 without comorbidity)  Lifestyle modifications    At risk for obstructive sleep apnea  Referral to sleep studies    Patient is forgetful, recommend patient to have wife to come with hm next visit to further discuss " about health maintenance.     Follow-up:Return in about 2 weeks (around 11/22/2019), or if symptoms worsen or fail to improve.    This note was created using voice recognition software. There may be unintended errors in spelling, grammar or content.

## 2019-11-08 NOTE — ASSESSMENT & PLAN NOTE
Not well controlled today.  Patient is tapering off metoprolol. Will add lisinopril. He will monitor BP BID and follow with us in two weeks.

## 2019-12-09 ENCOUNTER — APPOINTMENT (OUTPATIENT)
Dept: MEDICAL GROUP | Facility: PHYSICIAN GROUP | Age: 79
End: 2019-12-09
Payer: MEDICARE

## 2020-01-06 ENCOUNTER — OFFICE VISIT (OUTPATIENT)
Dept: MEDICAL GROUP | Facility: PHYSICIAN GROUP | Age: 80
End: 2020-01-06
Payer: MEDICARE

## 2020-01-06 VITALS
DIASTOLIC BLOOD PRESSURE: 88 MMHG | OXYGEN SATURATION: 98 % | TEMPERATURE: 97.6 F | RESPIRATION RATE: 16 BRPM | SYSTOLIC BLOOD PRESSURE: 130 MMHG | BODY MASS INDEX: 35.62 KG/M2 | HEIGHT: 70 IN | WEIGHT: 248.8 LBS | HEART RATE: 76 BPM

## 2020-01-06 DIAGNOSIS — Z12.11 SCREENING FOR COLON CANCER: ICD-10-CM

## 2020-01-06 DIAGNOSIS — E78.49 OTHER HYPERLIPIDEMIA: ICD-10-CM

## 2020-01-06 DIAGNOSIS — I25.10 CORONARY ARTERY DISEASE INVOLVING NATIVE CORONARY ARTERY OF NATIVE HEART WITHOUT ANGINA PECTORIS: ICD-10-CM

## 2020-01-06 DIAGNOSIS — I10 ESSENTIAL HYPERTENSION: ICD-10-CM

## 2020-01-06 PROBLEM — H25.9 AGE-RELATED CATARACT OF RIGHT EYE: Status: RESOLVED | Noted: 2019-09-26 | Resolved: 2020-01-06

## 2020-01-06 PROCEDURE — 99214 OFFICE O/P EST MOD 30 MIN: CPT | Performed by: INTERNAL MEDICINE

## 2020-01-06 RX ORDER — LISINOPRIL 5 MG/1
5 TABLET ORAL DAILY
Qty: 30 TAB | Refills: 1 | Status: SHIPPED | OUTPATIENT
Start: 2020-01-06 | End: 2020-02-11 | Stop reason: SDUPTHER

## 2020-01-06 ASSESSMENT — PATIENT HEALTH QUESTIONNAIRE - PHQ9: CLINICAL INTERPRETATION OF PHQ2 SCORE: 0

## 2020-01-06 NOTE — PROGRESS NOTES
Established Patient    Kyle Fleming is a 79 y.o. male who presents today with the following:    CC:   Chief Complaint   Patient presents with   • Follow-Up     BP       HPI:     Essential hypertension  Uncontrolled. -150s. He did not take his lisinopril for a month. Refilled, He will monitor BP BID and follow with us in one month        Coronary artery disease involving native coronary artery of native heart without angina pectoris  CAD s/p JOSSELINE (Everolimus eluting) in March 2018. On Aspirin, statin. Cardiology discontinued metoprolol in Nov 2019.  Will close monitor his BP and titrate BP med.   No chest pain, SOB, palpitation  Following with [facility]      Screening for colon cancer  Declined colonoscopy but ok for FIT test.  Benefit and risk discussed regarding colon CA screening.         Current Outpatient Medications   Medication Sig Dispense Refill   • lisinopril (PRINIVIL) 5 MG Tab Take 1 Tab by mouth every day. 30 Tab 1   • ketorolac (ACULAR) 0.5 % Solution INSTILL 1 GTT INTO AFFECTED EYE QID  0   • atorvastatin (LIPITOR) 80 MG tablet Take 1 Tab by mouth every day. 90 Tab 3   • Multiple Vitamins-Minerals (OCUVITE-LUTEIN) Tab Take 1 tablet by mouth every day.     • Cholecalciferol (VITAMIN D3) 3000 units Tab Take  by mouth.     • aspirin 81 MG EC tablet Take 1 Tab by mouth every day. 90 Tab 3     No current facility-administered medications for this visit.        Allergies, past medical history, past surgical history, medications, family history, social history reviewed and updated.    ROS   Constitutional: Denies fevers or chills  Eyes: Denies changes in vision  Ears/Nose/Throat/Mouth: Denies nasal congestion or sore throat   Cardiovascular: Denies chest pain or palpitations   Respiratory: Denies shortness of breath , Denies cough  Gastrointestinal/Hepatic: Denies abd pain, nausea, vomiting   Genitourinary: Denies dysuria or frequency  Musculoskeletal/Rheum: Denies joint pain and swelling  "  Neurological: Denies headache  Psychiatric: Denies mood disorder   Endocrine: Denies hx of diabetes or thyroid dysfunction  Heme/Oncology/Lymph Nodes: Denies weight changes or enlarged LNs.    Physical Exam  Vitals: /88 (BP Location: Left arm, Patient Position: Sitting, BP Cuff Size: Large adult)   Pulse 76   Temp 36.4 °C (97.6 °F) (Temporal)   Resp 16   Ht 1.778 m (5' 10\")   Wt 112.9 kg (248 lb 12.8 oz)   SpO2 98%   BMI 35.70 kg/m²   General: Alert, pleasant, NAD  HEENT: Normocephalic.  EOMI, no icterus or pallor.  Conjunctivae and lids normal. External ears normal. Oropharynx non-erythematous, mucous membranes moist.  Neck supple.  No thyromegaly or masses palpated.   Lymph: No cervical or supraclavicular lymphadenopathy.  Cardiovascular: Regular rate and rhythm.    Respiratory: Normal respiratory effort.  Clear to auscultation bilaterally.  Abdomen: Non-distended, soft  Skin: Warm, dry, no rashes.  Musculoskeletal: Gait is normal.  Moves all extremities well.  Extremities: No leg edema.     Psych:  Affect/mood is normal, judgement is good, memory is intact, grooming is appropriate.      Assessment and Plan    1. Essential hypertension  - lisinopril (PRINIVIL) 5 MG Tab; Take 1 Tab by mouth every day.  Dispense: 30 Tab; Refill: 1  DASH diet  Monitor BP BID    2. Coronary artery disease involving native coronary artery of native heart without angina pectoris  Aspirin, statin, lisinopril  Follow with cardiology    3. Screening for colon cancer  - OCCULT BLOOD,FECAL,IMMUNOASSAY    4. Other hyperlipidemia  statin      Follow-up:Return in about 1 month (around 2/6/2020), or if symptoms worsen or fail to improve.    This note was created using voice recognition software. There may be unintended errors in spelling, grammar or content.    "

## 2020-01-06 NOTE — ASSESSMENT & PLAN NOTE
Declined colonoscopy but ok for FIT test.  Benefit and risk discussed regarding colon CA screening.

## 2020-01-06 NOTE — ASSESSMENT & PLAN NOTE
CAD s/p JOSSELINE (Everolimus eluting) in March 2018. On Aspirin, statin. Cardiology discontinued metoprolol in Nov 2019.  Will close monitor his BP and titrate BP med.   No chest pain, SOB, palpitation  Following with [facility]

## 2020-01-06 NOTE — ASSESSMENT & PLAN NOTE
Uncontrolled. -150s. He did not take his lisinopril for a month. Refilled, He will monitor BP BID and follow with us in one month

## 2020-01-30 ENCOUNTER — HOSPITAL ENCOUNTER (OUTPATIENT)
Facility: MEDICAL CENTER | Age: 80
End: 2020-01-30
Attending: INTERNAL MEDICINE
Payer: MEDICARE

## 2020-01-30 PROCEDURE — 82274 ASSAY TEST FOR BLOOD FECAL: CPT

## 2020-02-07 LAB — HEMOCCULT STL QL IA: NEGATIVE

## 2020-02-11 ENCOUNTER — OFFICE VISIT (OUTPATIENT)
Dept: MEDICAL GROUP | Facility: PHYSICIAN GROUP | Age: 80
End: 2020-02-11
Payer: MEDICARE

## 2020-02-11 VITALS
WEIGHT: 243.6 LBS | HEIGHT: 70 IN | BODY MASS INDEX: 34.88 KG/M2 | DIASTOLIC BLOOD PRESSURE: 80 MMHG | TEMPERATURE: 97.3 F | SYSTOLIC BLOOD PRESSURE: 122 MMHG | OXYGEN SATURATION: 97 % | RESPIRATION RATE: 16 BRPM | HEART RATE: 68 BPM

## 2020-02-11 DIAGNOSIS — I10 ESSENTIAL HYPERTENSION: ICD-10-CM

## 2020-02-11 DIAGNOSIS — E78.49 OTHER HYPERLIPIDEMIA: ICD-10-CM

## 2020-02-11 DIAGNOSIS — I25.10 CORONARY ARTERY DISEASE INVOLVING NATIVE CORONARY ARTERY OF NATIVE HEART WITHOUT ANGINA PECTORIS: ICD-10-CM

## 2020-02-11 DIAGNOSIS — I25.119 CORONARY ARTERY DISEASE WITH ANGINA PECTORIS, UNSPECIFIED VESSEL OR LESION TYPE, UNSPECIFIED WHETHER NATIVE OR TRANSPLANTED HEART (HCC): ICD-10-CM

## 2020-02-11 DIAGNOSIS — E66.9 OBESITY (BMI 35.0-39.9 WITHOUT COMORBIDITY): ICD-10-CM

## 2020-02-11 PROCEDURE — 99214 OFFICE O/P EST MOD 30 MIN: CPT | Performed by: INTERNAL MEDICINE

## 2020-02-11 RX ORDER — LISINOPRIL 5 MG/1
5 TABLET ORAL DAILY
Qty: 90 TAB | Refills: 1 | Status: SHIPPED | OUTPATIENT
Start: 2020-02-11 | End: 2020-06-26

## 2020-02-11 RX ORDER — ATORVASTATIN CALCIUM 80 MG/1
80 TABLET, FILM COATED ORAL DAILY
Qty: 90 TAB | Refills: 3 | Status: SHIPPED | OUTPATIENT
Start: 2020-02-11 | End: 2020-12-10

## 2020-02-11 RX ORDER — LISINOPRIL 5 MG/1
5 TABLET ORAL DAILY
Qty: 90 TAB | Refills: 3 | Status: CANCELLED | OUTPATIENT
Start: 2020-02-11

## 2020-02-11 RX ORDER — ATORVASTATIN CALCIUM 80 MG/1
80 TABLET, FILM COATED ORAL DAILY
Qty: 90 TAB | Refills: 3 | Status: CANCELLED | OUTPATIENT
Start: 2020-02-11

## 2020-02-11 RX ORDER — ATORVASTATIN CALCIUM 40 MG/1
TABLET, FILM COATED ORAL
COMMUNITY
Start: 2020-01-08 | End: 2020-02-11

## 2020-02-11 NOTE — ASSESSMENT & PLAN NOTE
Per patient, his BP is controlled when he takes lisinopril, however, he forgot to bring his BP log. Refilled, He will monitor BP BID and follow with us in three month

## 2020-02-11 NOTE — PROGRESS NOTES
Established Patient    Kyle Fleming is a 80 y.o. male who presents today with the following:    CC:   Chief Complaint   Patient presents with   • Follow-Up     med review       HPI:     Obesity (BMI 35.0-39.9 without comorbidity)  Body mass index is 34.95 kg/m².  Lifestyle modification      Coronary artery disease involving native coronary artery of native heart without angina pectoris  CAD s/p JOSSELINE (Everolimus eluting) in March 2018. On Aspirin, statin, lisinopril. Cardiology discontinued metoprolol in Nov 2019.  Will close monitor his BP and titrate BP med.   No chest pain, SOB, palpitation  Following with [facility]      Essential hypertension  Per patient, his BP is controlled when he takes lisinopril, however, he forgot to bring his BP log. Refilled, He will monitor BP BID and follow with us in three month      Lump  Left dorsum of medial foot lump painful  Occur 4 months ago. Getting bigger worse with pressing on it.   Accidentally dropped a brick on his left foot in mid 2019   His big toenail was initially bruised and fell off, his big toenail grew back. He notice painful small lump grew on the proximal end of injury.  Urgent referral to podiatry        Current Outpatient Medications   Medication Sig Dispense Refill   • atorvastatin (LIPITOR) 80 MG tablet Take 1 Tab by mouth every day. 90 Tab 3   • lisinopril (PRINIVIL) 5 MG Tab Take 1 Tab by mouth every day. 90 Tab 1   • Multiple Vitamins-Minerals (OCUVITE-LUTEIN) Tab Take 1 tablet by mouth every day.     • Cholecalciferol (VITAMIN D3) 3000 units Tab Take  by mouth.     • aspirin 81 MG EC tablet Take 1 Tab by mouth every day. 90 Tab 3     No current facility-administered medications for this visit.        Allergies, past medical history, past surgical history, medications, family history, social history reviewed and updated.    ROS   Constitutional: Denies fevers or chills  Eyes: Denies changes in vision  Ears/Nose/Throat/Mouth: Denies nasal  "congestion or sore throat   Cardiovascular: Denies chest pain or palpitations   Respiratory: Denies shortness of breath , Denies cough  Gastrointestinal/Hepatic: Denies abd pain, nausea, vomiting   Genitourinary: Denies dysuria or frequency  Musculoskeletal/Rheum: left foot lump pain Denies joint pain and swelling   Neurological: Denies headache  Psychiatric: Denies mood disorder   Endocrine: Denies hx of diabetes or thyroid dysfunction  Heme/Oncology/Lymph Nodes: Denies weight changes or enlarged LNs.    Physical Exam  Vitals: /80 (BP Location: Left arm, Patient Position: Sitting, BP Cuff Size: Large adult)   Pulse 68   Temp 36.3 °C (97.3 °F) (Temporal)   Resp 16   Ht 1.778 m (5' 10\")   Wt 110.5 kg (243 lb 9.6 oz)   SpO2 97%   BMI 34.95 kg/m²   General: Alert, pleasant, NAD  HEENT: Normocephalic.  EOMI, no icterus or pallor.  Conjunctivae and lids normal. External ears normal. Oropharynx non-erythematous, mucous membranes moist.  Neck supple.  No thyromegaly or masses palpated.   Lymph: No cervical or supraclavicular lymphadenopathy.  Cardiovascular: Regular rate and rhythm.    Respiratory: Normal respiratory effort.  Clear to auscultation bilaterally.  Abdomen: Non-distended, soft, non-tender  Skin: Warm, dry, no rashes.  Musculoskeletal: Gait is normal.  Moves all extremities well.  Extremities: left anterior medial foot lump with tenderness. No leg edema.  Pedal pulses 2+ symmetric.   Psych:  Affect/mood is normal, judgement is good, memory is intact, grooming is appropriate.      Labs (9/27/19) were reviewed and discussed with patients.  All questions were answered.      Assessment and Plan    1. Lump  - REFERRAL TO PODIATRY    2. Essential hypertension  DASH diet  Monitor BP  - lisinopril (PRINIVIL) 5 MG Tab; Take 1 Tab by mouth every day.  Dispense: 90 Tab; Refill: 1  - CBC WITH DIFFERENTIAL; Future  - TSH WITH REFLEX TO FT4; Future  - Comp Metabolic Panel; Future    3. Coronary artery disease " involving native coronary artery of native heart without angina pectoris  Aspirin, lisinopril  - atorvastatin (LIPITOR) 80 MG tablet; Take 1 Tab by mouth every day.  Dispense: 90 Tab; Refill: 3    4. Other hyperlipidemia  - atorvastatin (LIPITOR) 80 MG tablet; Take 1 Tab by mouth every day.  Dispense: 90 Tab; Refill: 3  - Lipid Profile; Future    5. Obesity (BMI 35.0-39.9 without comorbidity)  Lifestyle modifications    Review currently available outside records, unable to find out his vaccinations hx  Patient states he will contact his previous primary care to find out his vaccination hx    Follow-up:Return in about 3 months (around 5/11/2020), or if symptoms worsen or fail to improve.    This note was created using voice recognition software. There may be unintended errors in spelling, grammar or content.

## 2020-02-11 NOTE — ASSESSMENT & PLAN NOTE
Left dorsum of medial foot lump painful  Occur 4 months ago. Getting bigger worse with pressing on it.   Accidentally dropped a brick on his left foot in mid 2019   His big toenail was initially bruised and fell off, his big toenail grew back. He notice painful small lump grew on the proximal end of injury.  Urgent referral to podiatry

## 2020-02-11 NOTE — ASSESSMENT & PLAN NOTE
CAD s/p JOSSELINE (Everolimus eluting) in March 2018. On Aspirin, statin, lisinopril. Cardiology discontinued metoprolol in Nov 2019.  Will close monitor his BP and titrate BP med.   No chest pain, SOB, palpitation  Following with [facility]

## 2020-02-26 ENCOUNTER — HOSPITAL ENCOUNTER (OUTPATIENT)
Dept: LAB | Facility: MEDICAL CENTER | Age: 80
End: 2020-02-26
Attending: INTERNAL MEDICINE
Payer: MEDICARE

## 2020-02-26 DIAGNOSIS — E78.49 OTHER HYPERLIPIDEMIA: ICD-10-CM

## 2020-02-26 DIAGNOSIS — I10 ESSENTIAL HYPERTENSION: ICD-10-CM

## 2020-02-26 LAB
ALBUMIN SERPL BCP-MCNC: 4.4 G/DL (ref 3.2–4.9)
ALBUMIN/GLOB SERPL: 1.6 G/DL
ALP SERPL-CCNC: 53 U/L (ref 30–99)
ALT SERPL-CCNC: 23 U/L (ref 2–50)
ANION GAP SERPL CALC-SCNC: 7 MMOL/L (ref 0–11.9)
AST SERPL-CCNC: 23 U/L (ref 12–45)
BASOPHILS # BLD AUTO: 0.6 % (ref 0–1.8)
BASOPHILS # BLD: 0.03 K/UL (ref 0–0.12)
BILIRUB SERPL-MCNC: 0.6 MG/DL (ref 0.1–1.5)
BUN SERPL-MCNC: 22 MG/DL (ref 8–22)
CALCIUM SERPL-MCNC: 9.2 MG/DL (ref 8.5–10.5)
CHLORIDE SERPL-SCNC: 107 MMOL/L (ref 96–112)
CHOLEST SERPL-MCNC: 137 MG/DL (ref 100–199)
CO2 SERPL-SCNC: 25 MMOL/L (ref 20–33)
CREAT SERPL-MCNC: 0.9 MG/DL (ref 0.5–1.4)
EOSINOPHIL # BLD AUTO: 0.19 K/UL (ref 0–0.51)
EOSINOPHIL NFR BLD: 3.6 % (ref 0–6.9)
ERYTHROCYTE [DISTWIDTH] IN BLOOD BY AUTOMATED COUNT: 43.7 FL (ref 35.9–50)
GLOBULIN SER CALC-MCNC: 2.8 G/DL (ref 1.9–3.5)
GLUCOSE SERPL-MCNC: 110 MG/DL (ref 65–99)
HCT VFR BLD AUTO: 42.3 % (ref 42–52)
HDLC SERPL-MCNC: 41 MG/DL
HGB BLD-MCNC: 14.1 G/DL (ref 14–18)
IMM GRANULOCYTES # BLD AUTO: 0.02 K/UL (ref 0–0.11)
IMM GRANULOCYTES NFR BLD AUTO: 0.4 % (ref 0–0.9)
LDLC SERPL CALC-MCNC: 77 MG/DL
LYMPHOCYTES # BLD AUTO: 1.51 K/UL (ref 1–4.8)
LYMPHOCYTES NFR BLD: 28.4 % (ref 22–41)
MCH RBC QN AUTO: 31.6 PG (ref 27–33)
MCHC RBC AUTO-ENTMCNC: 33.3 G/DL (ref 33.7–35.3)
MCV RBC AUTO: 94.8 FL (ref 81.4–97.8)
MONOCYTES # BLD AUTO: 0.54 K/UL (ref 0–0.85)
MONOCYTES NFR BLD AUTO: 10.2 % (ref 0–13.4)
NEUTROPHILS # BLD AUTO: 3.02 K/UL (ref 1.82–7.42)
NEUTROPHILS NFR BLD: 56.8 % (ref 44–72)
NRBC # BLD AUTO: 0 K/UL
NRBC BLD-RTO: 0 /100 WBC
PLATELET # BLD AUTO: 177 K/UL (ref 164–446)
PMV BLD AUTO: 12.5 FL (ref 9–12.9)
POTASSIUM SERPL-SCNC: 4.3 MMOL/L (ref 3.6–5.5)
PROT SERPL-MCNC: 7.2 G/DL (ref 6–8.2)
RBC # BLD AUTO: 4.46 M/UL (ref 4.7–6.1)
SODIUM SERPL-SCNC: 139 MMOL/L (ref 135–145)
TRIGL SERPL-MCNC: 95 MG/DL (ref 0–149)
WBC # BLD AUTO: 5.3 K/UL (ref 4.8–10.8)

## 2020-02-26 PROCEDURE — 84443 ASSAY THYROID STIM HORMONE: CPT

## 2020-02-26 PROCEDURE — 36415 COLL VENOUS BLD VENIPUNCTURE: CPT

## 2020-02-26 PROCEDURE — 85025 COMPLETE CBC W/AUTO DIFF WBC: CPT

## 2020-02-26 PROCEDURE — 80061 LIPID PANEL: CPT

## 2020-02-26 PROCEDURE — 80053 COMPREHEN METABOLIC PANEL: CPT

## 2020-02-27 LAB — TSH SERPL DL<=0.005 MIU/L-ACNC: 3.19 UIU/ML (ref 0.38–5.33)

## 2020-05-14 ENCOUNTER — APPOINTMENT (OUTPATIENT)
Dept: MEDICAL GROUP | Facility: PHYSICIAN GROUP | Age: 80
End: 2020-05-14
Payer: MEDICARE

## 2020-06-03 ENCOUNTER — TELEPHONE (OUTPATIENT)
Dept: MEDICAL GROUP | Facility: PHYSICIAN GROUP | Age: 80
End: 2020-06-03

## 2020-06-03 ENCOUNTER — TELEPHONE (OUTPATIENT)
Dept: CARDIOLOGY | Facility: MEDICAL CENTER | Age: 80
End: 2020-06-03

## 2020-06-03 NOTE — TELEPHONE ENCOUNTER
BLAIR/brent    Pt's wife Maggie calling to ask for aspirin hold advice for foot bone spur surgery to be done by [facility] to be done on 6/12, outpatient surgery.       Please call Maggie

## 2020-06-03 NOTE — TELEPHONE ENCOUNTER
Patient's wife Maggie called to notify Dr. Solorio that Kyle will be undergoing out patient surgery for a bone spur.  Surgery will be performed by Dr. Piyush Song.     Cardiologist has also been notified

## 2020-06-03 NOTE — TELEPHONE ENCOUNTER
You  Douglas Whittaker M.D. 1 hour ago (11:19 AM)       Pt ok to hold aspirin for 5 days prior to bone spur surgery?     Douglas Whittaker M.D.  You 26 minutes ago (12:36 PM)       Sounds reasonable

## 2020-06-03 NOTE — TELEPHONE ENCOUNTER
Returned Maggie's call. Pt is having a 10 minute outpatient bone spur procedure on 6/12. The surgeon told her to notify our office that pt would be holding aspirin for 5 days prior. Maggie doesn't believe that any sort of clearance letter is needed. She was just told to notify our office. She said if BE has any problems with pt holding aspirin to notify her, otherwise he will hold as instructed by surgeon.    To BE

## 2020-06-25 DIAGNOSIS — I10 ESSENTIAL HYPERTENSION: ICD-10-CM

## 2020-06-26 RX ORDER — LISINOPRIL 5 MG/1
TABLET ORAL
Qty: 90 TAB | Refills: 1 | Status: SHIPPED | OUTPATIENT
Start: 2020-06-26 | End: 2020-12-10

## 2020-10-14 ENCOUNTER — NURSE TRIAGE (OUTPATIENT)
Dept: HEALTH INFORMATION MANAGEMENT | Facility: OTHER | Age: 80
End: 2020-10-14

## 2020-10-14 NOTE — TELEPHONE ENCOUNTER
Regarding: symptoms - shortness of breath , fatigue  ----- Message from Ronna Denis sent at 10/14/2020  9:19 AM PDT -----  Wife, beau calling for , stating pt has shortness of breath, has feel more tired, fatigue and dizziness.    Pt cleared for doctor visit

## 2020-10-14 NOTE — TELEPHONE ENCOUNTER
1. Caller Name: Kyle Fleming                 Call Back Number: [phone] (home)   [facility] PCP or Specialty Provider: Yes         2.  In the last two weeks, has the patient had any new or worsening symptoms (not explained by alternative diagnosis)? No.    3.  Does patient have any comoribidities? None     4.  Has the patient traveled in the last 14 days OR had any known contact with someone who is suspected or confirmed to have COVID-19?  No.    5. Disposition: Cleared by RN Triage as potential is low for COVID-19; OK to keep/schedule appointment    Note routed to [facility] Provider: [place] only.

## 2020-10-15 ENCOUNTER — APPOINTMENT (OUTPATIENT)
Dept: RADIOLOGY | Facility: IMAGING CENTER | Age: 80
End: 2020-10-15
Attending: NURSE PRACTITIONER
Payer: MEDICARE

## 2020-10-15 ENCOUNTER — OFFICE VISIT (OUTPATIENT)
Dept: MEDICAL GROUP | Facility: PHYSICIAN GROUP | Age: 80
End: 2020-10-15
Payer: MEDICARE

## 2020-10-15 ENCOUNTER — OFFICE VISIT (OUTPATIENT)
Dept: URGENT CARE | Facility: CLINIC | Age: 80
End: 2020-10-15
Payer: MEDICARE

## 2020-10-15 ENCOUNTER — HOSPITAL ENCOUNTER (OUTPATIENT)
Facility: MEDICAL CENTER | Age: 80
End: 2020-10-15
Attending: NURSE PRACTITIONER
Payer: MEDICARE

## 2020-10-15 VITALS
SYSTOLIC BLOOD PRESSURE: 130 MMHG | TEMPERATURE: 97.2 F | HEART RATE: 98 BPM | RESPIRATION RATE: 24 BRPM | DIASTOLIC BLOOD PRESSURE: 82 MMHG | BODY MASS INDEX: 39.13 KG/M2 | HEIGHT: 69 IN | WEIGHT: 264.2 LBS | OXYGEN SATURATION: 98 %

## 2020-10-15 VITALS
BODY MASS INDEX: 36.42 KG/M2 | OXYGEN SATURATION: 96 % | HEIGHT: 69 IN | HEART RATE: 100 BPM | WEIGHT: 245.9 LBS | TEMPERATURE: 97 F

## 2020-10-15 DIAGNOSIS — R05.9 COUGH: ICD-10-CM

## 2020-10-15 DIAGNOSIS — R09.89 UPPER RESPIRATORY SYMPTOM: ICD-10-CM

## 2020-10-15 DIAGNOSIS — R53.83 FATIGUE, UNSPECIFIED TYPE: ICD-10-CM

## 2020-10-15 DIAGNOSIS — R06.02 SOB (SHORTNESS OF BREATH): ICD-10-CM

## 2020-10-15 DIAGNOSIS — I25.10 CORONARY ARTERY DISEASE INVOLVING NATIVE CORONARY ARTERY OF NATIVE HEART WITHOUT ANGINA PECTORIS: ICD-10-CM

## 2020-10-15 DIAGNOSIS — B34.9 VIRAL ILLNESS: ICD-10-CM

## 2020-10-15 DIAGNOSIS — I10 ESSENTIAL HYPERTENSION: ICD-10-CM

## 2020-10-15 DIAGNOSIS — R42 DIZZINESS: ICD-10-CM

## 2020-10-15 DIAGNOSIS — U07.1 COVID-19: ICD-10-CM

## 2020-10-15 DIAGNOSIS — H61.23 BILATERAL IMPACTED CERUMEN: ICD-10-CM

## 2020-10-15 DIAGNOSIS — E78.49 OTHER HYPERLIPIDEMIA: ICD-10-CM

## 2020-10-15 DIAGNOSIS — Z91.89 AT RISK FOR OBSTRUCTIVE SLEEP APNEA: ICD-10-CM

## 2020-10-15 PROCEDURE — 99214 OFFICE O/P EST MOD 30 MIN: CPT | Performed by: NURSE PRACTITIONER

## 2020-10-15 PROCEDURE — 71046 X-RAY EXAM CHEST 2 VIEWS: CPT | Mod: TC | Performed by: NURSE PRACTITIONER

## 2020-10-15 PROCEDURE — U0003 INFECTIOUS AGENT DETECTION BY NUCLEIC ACID (DNA OR RNA); SEVERE ACUTE RESPIRATORY SYNDROME CORONAVIRUS 2 (SARS-COV-2) (CORONAVIRUS DISEASE [COVID-19]), AMPLIFIED PROBE TECHNIQUE, MAKING USE OF HIGH THROUGHPUT TECHNOLOGIES AS DESCRIBED BY CMS-2020-01-R: HCPCS

## 2020-10-15 RX ORDER — ASCORBIC ACID 500 MG
500 TABLET ORAL DAILY
COMMUNITY
End: 2021-01-01

## 2020-10-15 RX ORDER — MULTIVITAMIN WITH IRON
TABLET ORAL DAILY
COMMUNITY

## 2020-10-15 ASSESSMENT — ENCOUNTER SYMPTOMS
NAUSEA: 1
CARDIOVASCULAR NEGATIVE: 1
CONSTIPATION: 1
COUGH: 1
MYALGIAS: 1
FOCAL WEAKNESS: 0
SENSORY CHANGE: 0
HEADACHES: 1
SHORTNESS OF BREATH: 1
FEVER: 0
DIZZINESS: 1

## 2020-10-15 ASSESSMENT — FIBROSIS 4 INDEX
FIB4 SCORE: 2.11

## 2020-10-15 ASSESSMENT — VISUAL ACUITY: OU: 1

## 2020-10-15 NOTE — ASSESSMENT & PLAN NOTE
New to me. Chronic problem, well controlled on Lisinopril 5 mg . Denies CP, SOB, dizziness, HAs, peripheral edema.

## 2020-10-15 NOTE — PROGRESS NOTES
Subjective:     Kyle Fleming is a 80 y.o. male who presents for Headache, Shortness of Breath (distance walking), Dizziness (unstable when walking), Cough (dry), Nausea, and Constipation       Shortness of Breath  Associated symptoms include headaches. Pertinent negatives include no chest pain or fever.   Dizziness  Associated symptoms include coughing, headaches and myalgias. Pertinent negatives include no chest pain or fever.   Cough  Associated symptoms include headaches, myalgias and shortness of breath. Pertinent negatives include no chest pain or fever.   Nausea  Associated symptoms include coughing, headaches and myalgias. Pertinent negatives include no chest pain or fever.   Constipation  Pertinent negatives include no fever.   Headache   Associated symptoms include coughing and dizziness. Pertinent negatives include no fever.     Patient reports he was referred by his PCP for further evaluation of worsening SOB, cough, and dizziness which started about 3 weeks ago.  Patient initially went to the local ED and had a workup started including a chest x-ray.  Patient reports that they attempted to draw blood but were not able to do so.  Patient's PCP also ordered a COVID test.  However, after waiting 3 hours without seeing anyone, patient left.  He did not get the results of the chest x-ray.    Patient was screened prior to rooming and denied COVID-19 diagnosis or contact with a person who has been diagnosed or is suspected to have COVID-19. During this visit, appropriate PPE was worn, hand hygiene was performed, and the patient and any visitors were masked.     PMH:  has a past medical history of Hyperlipidemia and Hypertension.    MEDS:   Current Outpatient Medications:   •  Magnesium 250 MG Tab, Take  by mouth every day., Disp: , Rfl:   •  ascorbic acid (ASCORBIC ACID) 500 MG Tab, Take 500 mg by mouth every day., Disp: , Rfl:   •  Fexofenadine HCl (ALLERGY 24-HR PO), Take  by mouth every day., Disp: , Rfl:  "  •  lisinopril (PRINIVIL) 5 MG Tab, TAKE 1 TABLET BY MOUTH  EVERY DAY, Disp: 90 Tab, Rfl: 1  •  atorvastatin (LIPITOR) 80 MG tablet, Take 1 Tab by mouth every day., Disp: 90 Tab, Rfl: 3  •  Multiple Vitamins-Minerals (OCUVITE-LUTEIN) Tab, Take 1 tablet by mouth every day., Disp: , Rfl:   •  Cholecalciferol (VITAMIN D3) 3000 units Tab, Take  by mouth., Disp: , Rfl:   •  aspirin 81 MG EC tablet, Take 1 Tab by mouth every day., Disp: 90 Tab, Rfl: 3    ALLERGIES:   Allergies   Allergen Reactions   • Ciprofloxacin Hcl Rash     rash     SURGHX:   Past Surgical History:   Procedure Laterality Date   • EYE SURGERY Left 2019    left glaucoma and cataract surgery 10/2019   • OTHER  2018    left shoulder rotator cuff repair   • MULTIPLE CORONARY ARTERY BYPASS  2018   • LAMINOTOMY  2011    laminectomy, fixation  hardware. MRI compatible     SOCHX:  reports that he quit smoking about 36 years ago. His smoking use included cigarettes. He has never used smokeless tobacco. He reports previous alcohol use. He reports that he does not use drugs.     FH: Reviewed with patient, not pertinent to this visit.    Review of Systems   Constitutional: Positive for malaise/fatigue. Negative for fever.   HENT: Negative.    Respiratory: Positive for cough and shortness of breath.    Cardiovascular: Negative.  Negative for chest pain.   Gastrointestinal: Positive for constipation.   Musculoskeletal: Positive for myalgias.   Neurological: Positive for dizziness and headaches. Negative for sensory change and focal weakness.   All other systems reviewed and are negative.    Additional details per HPI.      Objective:     Pulse 100   Temp 36.1 °C (97 °F)   Ht 1.753 m (5' 9\")   Wt 111.5 kg (245 lb 14.4 oz)   SpO2 96%   BMI 36.31 kg/m²     Physical Exam  Vitals signs reviewed.   Constitutional:       General: He is not in acute distress.     Appearance: He is well-developed. He is not ill-appearing or toxic-appearing.   HENT:      Head: " Normocephalic.      Right Ear: External ear normal. There is impacted cerumen.      Left Ear: Tympanic membrane and external ear normal.      Nose: Nose normal.      Mouth/Throat:      Mouth: Mucous membranes are moist.      Pharynx: Oropharynx is clear. Uvula midline.   Eyes:      General: Vision grossly intact.      Extraocular Movements: Extraocular movements intact.      Conjunctiva/sclera: Conjunctivae normal.      Pupils: Pupils are equal, round, and reactive to light.   Neck:      Musculoskeletal: Normal range of motion.   Cardiovascular:      Rate and Rhythm: Normal rate and regular rhythm.      Pulses: Normal pulses.      Heart sounds: Normal heart sounds.   Pulmonary:      Effort: Pulmonary effort is normal. No respiratory distress.      Breath sounds: Normal breath sounds. No decreased breath sounds, wheezing, rhonchi or rales.   Abdominal:      General: Bowel sounds are normal.   Musculoskeletal: Normal range of motion.         General: No deformity.   Skin:     General: Skin is warm and dry.      Coloration: Skin is not pale.   Neurological:      General: No focal deficit present.      Mental Status: He is alert and oriented to person, place, and time.      Sensory: No sensory deficit.      Motor: No weakness.      Coordination: Coordination normal.   Psychiatric:         Behavior: Behavior normal. Behavior is cooperative.     Chest x-ray:    Details    Reading Physician Reading Date Result Priority   John Marshall M.D.  [phone] 10/15/2020 [facility]      Narrative & Impression        10/15/2020 3:39 PM     HISTORY/REASON FOR EXAM:  Shortness of Breath.     TECHNIQUE/EXAM DESCRIPTION AND NUMBER OF VIEWS:  Two views of the chest.     COMPARISON:  None.     FINDINGS:  The heart is normal in size.  No pulmonary infiltrates or consolidations are noted.  No pleural effusions are appreciated.  There are old left rib fractures. No pneumothorax.     IMPRESSION:     No active disease.             Last  Resulted: 10/15/20  3:51 PM          Assessment/Plan:     1. SOB (shortness of breath)  - DX-CHEST-2 VIEWS; Future    2. Cough  - COVID/SARS CoV-2 PCR; Future  - DX-CHEST-2 VIEWS; Future    3. Dizziness    4. Fatigue, unspecified type    5. Viral illness    6. Bilateral impacted cerumen    X-ray of chest ordered. Radiology report and images reviewed by myself. Negative.    Discussed likely self-limiting viral etiology and expected course and duration of illness. Vital signs stable, afebrile, no acute distress at this time.     COVID test pending. The CDC recommends that any person who has symptoms of COVID-19 self-isolates until 1) at least 10 days have elapsed since symptom onset, and 2) at least 24 hours have passed since resolution of any fever without use of fever-reducing medications, and 3) other symptoms have improved. According to the CDC, the patient may leave self-isolation once all of these criteria have been met.     Lavage performed. Patient tolerated well, no complications.    Differential diagnosis, natural history, supportive care, rest, fluids, over-the-counter symptom management per 's instructions, close monitoring, and indications for immediate follow-up discussed.     Patient advised to: Return for 1) Symptoms that worsen/don't improve, or go to ER, 2) Follow up with primary care in 7-10 days.    All questions answered. Patient agrees with the plan of care.    Discharge summary provided.

## 2020-10-15 NOTE — ASSESSMENT & PLAN NOTE
New to me.  Patient reports multiple symptoms that came on 2 weeks ago that include acute fatigue, rhinitis, sinus congestion, headache, loss of taste and smell sensation.  He is  also experiencing dizziness, , nausea w/out vomiting, and SOB on exertion which is getting worse.  He denies fevers, chills, CP, no cough or wheezing, earache, no throat pain.  He is tolerating fluids and other p.o. intake.  Patient has tried nothing for symptoms.

## 2020-10-15 NOTE — ASSESSMENT & PLAN NOTE
New to me. Chronic problem, well controlled on Lipitor 80 mg. No DM, BP is well controlled, history of 10 yrs (quit in 1984).  Most recent lipid panel is [facility] from February 2020

## 2020-10-15 NOTE — PROGRESS NOTES
Chief Complaint   Patient presents with   • Establish Care       HISTORY OF PRESENT ILLNESS: Patient is a 80 y.o. male, established patient who presents today to discuss medical problems as listed below:    Health Maintenance: pt is sick today, no vaccines administered.    Essential hypertension  New to me. Chronic problem, well controlled on Lisinopril 5 mg . Denies CP, SOB, dizziness, HAs, peripheral edema.    Other hyperlipidemia  New to me. Chronic problem, well controlled on Lipitor 80 mg. No DM, BP is well controlled, history of 10 yrs (quit in 1984).  Most recent lipid panel is [facility]NL from February 2020    At risk for obstructive sleep apnea  New to me.  Patient here with his wife.  Wife reports noting patient holding his breath and also intermittent occasional snoring.  Patient denies daytime sleepiness, fatigue or weakness.    Upper respiratory symptom  New to me.  Patient reports multiple symptoms that came on 2 weeks ago that include acute fatigue, rhinitis, sinus congestion, headache, loss of taste and smell sensation.  He is  also experiencing dizziness, , nausea w/out vomiting, and SOB on exertion which is getting worse.  He denies fevers, chills, CP, no cough or wheezing, earache, no throat pain.  He is tolerating fluids and other p.o. intake.  Patient has tried nothing for symptoms.      Patient Active Problem List    Diagnosis Date Noted   • Upper respiratory symptom 10/15/2020   • Lump 02/11/2020   • Screening for colon cancer 01/06/2020   • At risk for obstructive sleep apnea 11/08/2019   • Essential hypertension 10/11/2019   • Other hyperlipidemia 10/11/2019   • Coronary artery disease involving native coronary artery of native heart without angina pectoris 09/26/2019   • Obesity (BMI 35.0-39.9 without comorbidity) 09/26/2019        Allergies: Ciprofloxacin hcl    Current Outpatient Medications   Medication Sig Dispense Refill   • Magnesium 250 MG Tab Take  by mouth every day.     • ascorbic acid  (ASCORBIC ACID) 500 MG Tab Take 500 mg by mouth every day.     • Fexofenadine HCl (ALLERGY 24-HR PO) Take  by mouth every day.     • lisinopril (PRINIVIL) 5 MG Tab TAKE 1 TABLET BY MOUTH  EVERY DAY 90 Tab 1   • atorvastatin (LIPITOR) 80 MG tablet Take 1 Tab by mouth every day. 90 Tab 3   • Multiple Vitamins-Minerals (OCUVITE-LUTEIN) Tab Take 1 tablet by mouth every day.     • Cholecalciferol (VITAMIN D3) 3000 units Tab Take  by mouth.     • aspirin 81 MG EC tablet Take 1 Tab by mouth every day. 90 Tab 3     No current facility-administered medications for this visit.        Social History     Tobacco Use   • Smoking status: Former Smoker     Types: Cigarettes     Quit date:      Years since quittin.8   • Smokeless tobacco: Never Used   Substance Use Topics   • Alcohol use: Not Currently     Comment: wine   • Drug use: Never     Social History     Social History Narrative   • Not on file       Family History   Problem Relation Age of Onset   • Cancer Brother         lung       Allergies, past medical history, past surgical history, family history, social history reviewed and updated.    Review of Systems:     - Constitutional: fatigue. Negative for fever, chills, unexpected weight change,/generalized weakness.     - HEENT: Rhinitis, sinus congestion, headaches.  Negative for vision changes, hearing changes, ear pain, ear discharge, sore throat, and neck pain.      - Respiratory: Dyspnea on exertion.  Negative for cough, sputum production, chest congestion, dyspnea, wheezing, and crackles.      - Cardiovascular: Negative for chest pain, palpitations, orthopnea, and bilateral lower extremity edema.     - Gastrointestinal: Intermittent nausea.  Negative for heartburn, vomiting, abdominal pain, hematochezia, melena, diarrhea, constipation, and greasy/foul-smelling stools.     - Genitourinary: Negative for dysuria, polyuria, hematuria, pyuria, urinary urgency, and urinary incontinence.    - Musculoskeletal:  "Negative for myalgias, back pain, and joint pain.     - Skin: Negative for rash, itching, cyanotic skin color change.     - Neurological: Negative for dizziness, tingling, tremors, focal sensory deficit, focal weakness and headaches.     - Endo/Heme/Allergies: Does not bruise/bleed easily.     - Psychiatric/Behavioral: Negative for depression, suicidal/homicidal ideation and memory loss.      All other systems reviewed and are negative    Exam:    /82 (BP Location: Left arm, Patient Position: Sitting, BP Cuff Size: Adult)   Pulse 98   Temp 36.2 °C (97.2 °F) (Temporal)   Resp (!) 24   Ht 1.753 m (5' 9\")   Wt 119.8 kg (264 lb 3.2 oz)   SpO2 98%   BMI 39.02 kg/m²  Body mass index is 39.02 kg/m².    Physical Exam:  Constitutional: Well-developed and well-nourished. Not diaphoretic. No distress.   Skin: Pale. No rash noted.  Head: Atraumatic without lesions.  Eyes: Conjunctivae and extraocular motions are normal. Pupils are equal, round, and reactive to light. No scleral icterus.   Ears:  External ears unremarkable. Tympanic membranes clear and intact.  Nose: Nares patent. Septum midline. Turbinates without erythema nor edema. No discharge.   Mouth/Throat: Dentition is normal. Tongue normal. Oropharynx is clear and moist. Posterior pharynx without erythema or exudates.  Neck: Supple, trachea midline. Normal range of motion. No thyromegaly present. No lymphadenopathy--cervical or supraclavicular.  Cardiovascular: Regular rate and rhythm, S1 and S2 without murmur, rubs, or gallops.    Chest: Effort normal. Clear to auscultation throughout. No adventitious sounds. No CVA tenderness.  Abdomen: Soft, non tender, and without distention. Active bowel sounds in all four quadrants. No rebound, guarding, masses or HSM.  : Negative for dysuria, polyuria, hematuria, pyuria, urinary urgency, and urinary incontinence.  Extremities: No cyanosis, clubbing, erythema, nor edema. Distal pulses intact and symmetric. "   Musculoskeletal: All major joints AROM full in all directions without pain.  Neurological: Alert and oriented x 3. DTRs 2+/3 and symmetric. No cranial nerve deficit. 5/5 myotomes. Sensation intact. Negative Rhomberg.  Psychiatric:  Behavior, mood, and affect are appropriate.  MA/nursing note and vitals reviewed.    LABS: 2/26  results reviewed and discussed with the patient, questions answered.    Assessment/Plan:  1. Essential hypertension  Stable on current regimen.  Continue.  - CBC WITH DIFFERENTIAL; Future  - Comp Metabolic Panel; Future    2. Coronary artery disease involving native coronary artery of native heart without angina pectoris  Followed by [facility] cardiology.    3. Other hyperlipidemia  Stable on current regimen.  Continue.  - Lipid Profile; Future    4. COVID-19  Will review results and discuss findings with patient.  - COVID/SARS COV-2 PCR; Future    5. SOB (shortness of breath)  As discussed in #7  - COVID/SARS COV-2 PCR; Future    6. At risk for obstructive sleep apnea  - REFERRAL TO PULMONARY AND SLEEP MEDICINE Sleep Medicine    7. Upper respiratory symptom  Uncontrolled.  DDX includes COVID etiology  versus other viral URI versus cardiac etiology based on progressive unresolved nausea, dizziness and extensive cardiac history.  Based on symptoms and with concern of patient's feeling progressively worse to be evaluated in ED.  Also recommend call with testing.  Report will be given to [facility] as patient is planning on going there after this visit.  - DX-CHEST-2 VIEWS; Future       Discussed with patient possible alternative diagnoses, pt is to take all medications as prescribed. If symptoms persist FU w/PCP, if symptoms worsen go to emergency room. If experiencing any side effects from prescribed medications reports to the office immediately or go to emergency room.  Reviewed indication, dosage, usage and potential adverse effects of prescribed medications. Reviewed risks and benefits  of treatment plan. Patient verbalizes understanding of all instruction and verbally agrees to plan.    Return if symptoms worsen or fail to improve.

## 2020-10-15 NOTE — ASSESSMENT & PLAN NOTE
New to me.  Patient here with his wife.  Wife reports noting patient holding his breath and also intermittent occasional snoring.  Patient denies daytime sleepiness, fatigue or weakness.

## 2020-10-16 LAB
COVID ORDER STATUS COVID19: NORMAL
SARS-COV-2 RNA RESP QL NAA+PROBE: NOTDETECTED
SPECIMEN SOURCE: NORMAL

## 2020-10-17 NOTE — RESULT ENCOUNTER NOTE
Phoned patient to discuss results.  Spoke with the patient's wife.  Patient complaining of bilateral joint and knee pain.  Discussed likely self-limiting viral etiology and expected course and duration of illness.  Advised close monitoring, supportive measures including increasing fluids and rest, and over-the-counter symptom management per 's instructions including ibuprofen.  Follow up with PCP return to UC if no improvement in 3 days.  Return sooner or go to the ER if symptoms change or worsen.  All questions answered.

## 2020-10-20 ENCOUNTER — HOSPITAL ENCOUNTER (OUTPATIENT)
Dept: LAB | Facility: MEDICAL CENTER | Age: 80
End: 2020-10-20
Attending: NURSE PRACTITIONER
Payer: MEDICARE

## 2020-10-20 LAB
ALBUMIN SERPL BCP-MCNC: 3.4 G/DL (ref 3.2–4.9)
ALBUMIN/GLOB SERPL: 0.9 G/DL
ALP SERPL-CCNC: 500 U/L (ref 30–99)
ALT SERPL-CCNC: 902 U/L (ref 2–50)
ANION GAP SERPL CALC-SCNC: 13 MMOL/L (ref 7–16)
AST SERPL-CCNC: 838 U/L (ref 12–45)
BASOPHILS # BLD AUTO: 0.7 % (ref 0–1.8)
BASOPHILS # BLD: 0.04 K/UL (ref 0–0.12)
BILIRUB SERPL-MCNC: 4.9 MG/DL (ref 0.1–1.5)
BUN SERPL-MCNC: 15 MG/DL (ref 8–22)
CALCIUM SERPL-MCNC: 9.1 MG/DL (ref 8.5–10.5)
CHLORIDE SERPL-SCNC: 99 MMOL/L (ref 96–112)
CHOLEST SERPL-MCNC: 189 MG/DL (ref 100–199)
CO2 SERPL-SCNC: 19 MMOL/L (ref 20–33)
CREAT SERPL-MCNC: 0.7 MG/DL (ref 0.5–1.4)
EOSINOPHIL # BLD AUTO: 0.13 K/UL (ref 0–0.51)
EOSINOPHIL NFR BLD: 2.4 % (ref 0–6.9)
ERYTHROCYTE [DISTWIDTH] IN BLOOD BY AUTOMATED COUNT: 55.8 FL (ref 35.9–50)
GLOBULIN SER CALC-MCNC: 4 G/DL (ref 1.9–3.5)
GLUCOSE SERPL-MCNC: 114 MG/DL (ref 65–99)
HCT VFR BLD AUTO: 45.4 % (ref 42–52)
HDLC SERPL-MCNC: 20 MG/DL
HGB BLD-MCNC: 15.1 G/DL (ref 14–18)
IMM GRANULOCYTES # BLD AUTO: 0.03 K/UL (ref 0–0.11)
IMM GRANULOCYTES NFR BLD AUTO: 0.6 % (ref 0–0.9)
LDLC SERPL CALC-MCNC: 130 MG/DL
LYMPHOCYTES # BLD AUTO: 1.5 K/UL (ref 1–4.8)
LYMPHOCYTES NFR BLD: 27.6 % (ref 22–41)
MCH RBC QN AUTO: 31.5 PG (ref 27–33)
MCHC RBC AUTO-ENTMCNC: 33.3 G/DL (ref 33.7–35.3)
MCV RBC AUTO: 94.6 FL (ref 81.4–97.8)
MONOCYTES # BLD AUTO: 0.73 K/UL (ref 0–0.85)
MONOCYTES NFR BLD AUTO: 13.4 % (ref 0–13.4)
NEUTROPHILS # BLD AUTO: 3 K/UL (ref 1.82–7.42)
NEUTROPHILS NFR BLD: 55.3 % (ref 44–72)
NRBC # BLD AUTO: 0 K/UL
NRBC BLD-RTO: 0 /100 WBC
PLATELET # BLD AUTO: 153 K/UL (ref 164–446)
PMV BLD AUTO: 13 FL (ref 9–12.9)
POTASSIUM SERPL-SCNC: 4.3 MMOL/L (ref 3.6–5.5)
PROT SERPL-MCNC: 7.4 G/DL (ref 6–8.2)
RBC # BLD AUTO: 4.8 M/UL (ref 4.7–6.1)
SODIUM SERPL-SCNC: 131 MMOL/L (ref 135–145)
TRIGL SERPL-MCNC: 197 MG/DL (ref 0–149)
WBC # BLD AUTO: 5.4 K/UL (ref 4.8–10.8)

## 2020-10-20 PROCEDURE — 85025 COMPLETE CBC W/AUTO DIFF WBC: CPT

## 2020-10-20 PROCEDURE — 80053 COMPREHEN METABOLIC PANEL: CPT

## 2020-10-20 PROCEDURE — 36415 COLL VENOUS BLD VENIPUNCTURE: CPT

## 2020-10-20 PROCEDURE — 80061 LIPID PANEL: CPT

## 2020-10-23 ENCOUNTER — OFFICE VISIT (OUTPATIENT)
Dept: MEDICAL GROUP | Facility: PHYSICIAN GROUP | Age: 80
End: 2020-10-23
Payer: MEDICARE

## 2020-10-23 VITALS
HEIGHT: 69 IN | BODY MASS INDEX: 36.73 KG/M2 | WEIGHT: 248 LBS | HEART RATE: 91 BPM | OXYGEN SATURATION: 95 % | DIASTOLIC BLOOD PRESSURE: 90 MMHG | SYSTOLIC BLOOD PRESSURE: 120 MMHG | TEMPERATURE: 96.6 F

## 2020-10-23 DIAGNOSIS — R09.89 UPPER RESPIRATORY SYMPTOM: ICD-10-CM

## 2020-10-23 DIAGNOSIS — I10 ESSENTIAL HYPERTENSION: ICD-10-CM

## 2020-10-23 DIAGNOSIS — R94.5 ABNORMAL RESULTS OF LIVER FUNCTION STUDIES: ICD-10-CM

## 2020-10-23 DIAGNOSIS — E78.49 OTHER HYPERLIPIDEMIA: ICD-10-CM

## 2020-10-23 PROCEDURE — 99215 OFFICE O/P EST HI 40 MIN: CPT | Performed by: FAMILY MEDICINE

## 2020-10-23 RX ORDER — IBUPROFEN 800 MG/1
800 TABLET ORAL EVERY 8 HOURS PRN
Qty: 90 TAB | Refills: 1 | Status: SHIPPED | OUTPATIENT
Start: 2020-10-23 | End: 2021-02-18

## 2020-10-23 RX ORDER — A/SINGAPORE/GP1908/2015 IVR-180 (AN A/MICHIGAN/45/2015 (H1N1)PDM09-LIKE VIRUS, A/HONG KONG/4801/2014, NYMC X-263B (H3N2) (AN A/HONG KONG/4801/2014-LIKE VIRUS), AND B/BRISBANE/60/2008, WILD TYPE (A B/BRISBANE/60/2008-LIKE VIRUS) 15; 15; 15 UG/.5ML; UG/.5ML; UG/.5ML
INJECTION, SUSPENSION INTRAMUSCULAR
COMMUNITY
Start: 2020-09-19 | End: 2021-02-18

## 2020-10-23 ASSESSMENT — FIBROSIS 4 INDEX: FIB4 SCORE: 14.22

## 2020-10-23 NOTE — ASSESSMENT & PLAN NOTE
This patient has a history of hyperlipidemia for which she has been on atorvastatin 80 mg for very long time.  He is in complaining of joint pains being the remaining symptom but he absolutely denies muscle pain.  I am going to have him stop his atorvastatin because his liver functions are literally 200 times normal.  We are going to repeat studies figure out what is going on

## 2020-10-23 NOTE — ASSESSMENT & PLAN NOTE
This is a new problem for this patient where he went and had some blood work done because of his complaint of headaches and shortness of breath when he walked which then progressed to having a cough and overwhelming fatigue along with joint pain.  He is now left mainly with the joint pain which is fairly significant worse than his ever had.  He does find that the fatigue seems to be getting better.  He had already noted that he had very dark urine which is consistent with his bilirubin of 4.9.  In looking at the liver functions he had done his AST is elevated at 838 his ALT is 902 his alkaline phosphatase is 500  and his total bilirubin is 4.9

## 2020-10-23 NOTE — ASSESSMENT & PLAN NOTE
This is a chronic health problem for this patient where he is on blood pressure meds that have his blood pressure usually under excellent control.  Today is 120/90.  We will leave him on current medications.

## 2020-10-23 NOTE — ASSESSMENT & PLAN NOTE
When this patient was seen on 10/15/2020 he did admit to having had really bad headaches and then developed a cough along with fatigue and joint pain, no myalgias.  He had a Covid test on the 15th that is negative.  He has multiple other abnormalities in his blood tests that we are pursuing.

## 2020-10-26 NOTE — PROGRESS NOTES
CC:Diagnoses of Abnormal results of liver function studies, Essential hypertension, Other hyperlipidemia, and Upper respiratory symptom were pertinent to this visit.    HISTORY OF PRESENT ILLNESS: Patient is a 80 y.o. male established patient who previously saw NASIM Basilio. presents today to follow-up on blood work that was done on 10/20/2020.  The patient's wife is a retired RN and she had looked at these results and was very concerned when she saw them.  The liver functions are 200-300 times normal and the patient had been having respiratory illness symptoms so the concern was did he have corona virus.  He was checked for that and his test was negative.  He is here today to look at his results and determine next steps.      Abnormal results of liver function studies  This is a new problem for this patient where he went and had some blood work done because of his complaint of headaches and shortness of breath when he walked which then progressed to having a cough and overwhelming fatigue along with joint pain.  He is now left mainly with the joint pain which is fairly significant worse than his ever had.  He does find that the fatigue seems to be getting better.  He had already noted that he had very dark urine which is consistent with his bilirubin of 4.9.  In looking at the liver functions he had done his AST is elevated at 838 his ALT is 902 his alkaline phosphatase is 500  and his total bilirubin is 4.9    Essential hypertension  This is a chronic health problem for this patient where he is on blood pressure meds that have his blood pressure usually under excellent control.  Today is 120/90.  We will leave him on current medications.    Other hyperlipidemia  This patient has a history of hyperlipidemia for which she has been on atorvastatin 80 mg for very long time.  He is in complaining of joint pains being the remaining symptom but he absolutely denies muscle pain.  I am going to have him stop his  atorvastatin because his liver functions are literally 200 times normal.  We are going to repeat studies figure out what is going on    Upper respiratory symptom  When this patient was seen on 10/15/2020 he did admit to having had really bad headaches and then developed a cough along with fatigue and joint pain, no myalgias.  He had a Covid test on the 15th that is negative.  He has multiple other abnormalities in his blood tests that we are pursuing.      Patient Active Problem List    Diagnosis Date Noted   • Abnormal results of liver function studies 10/23/2020   • Upper respiratory symptom 10/15/2020   • Lump 02/11/2020   • Screening for colon cancer 01/06/2020   • At risk for obstructive sleep apnea 11/08/2019   • Essential hypertension 10/11/2019   • Other hyperlipidemia 10/11/2019   • Coronary artery disease involving native coronary artery of native heart without angina pectoris 09/26/2019   • Obesity (BMI 35.0-39.9 without comorbidity) 09/26/2019      Allergies:Ciprofloxacin hcl    Current Outpatient Medications   Medication Sig Dispense Refill   • ibuprofen (MOTRIN) 800 MG Tab Take 1 Tab by mouth every 8 hours as needed for Moderate Pain or Inflammation. 90 Tab 1   • Magnesium 250 MG Tab Take  by mouth every day.     • ascorbic acid (ASCORBIC ACID) 500 MG Tab Take 500 mg by mouth every day.     • Fexofenadine HCl (ALLERGY 24-HR PO) Take  by mouth every day.     • lisinopril (PRINIVIL) 5 MG Tab TAKE 1 TABLET BY MOUTH  EVERY DAY 90 Tab 1   • atorvastatin (LIPITOR) 80 MG tablet Take 1 Tab by mouth every day. 90 Tab 3   • Multiple Vitamins-Minerals (OCUVITE-LUTEIN) Tab Take 1 tablet by mouth every day.     • Cholecalciferol (VITAMIN D3) 3000 units Tab Take  by mouth.     • aspirin 81 MG EC tablet Take 1 Tab by mouth every day. 90 Tab 3   • FLUAD QUADRIVALENT 0.5 ML Prefilled Syringe PHARMACY ADMINISTERED       No current facility-administered medications for this visit.        Social History     Tobacco Use  "  • Smoking status: Former Smoker     Packs/day: 0.25     Years: 20.00     Pack years: 5.00     Types: Cigarettes     Quit date:      Years since quittin.8   • Smokeless tobacco: Never Used   Substance Use Topics   • Alcohol use: Not Currently     Comment: wine   • Drug use: Never     Social History     Social History Narrative   • Not on file       Family History   Problem Relation Age of Onset   • Other Mother         dementia   • Alcohol abuse Father    • Other Brother         dementia   • Other Sister         Alzheimer's   • Other Sister         Alzheimer's   • Cancer Brother         lung   • Cancer Brother         lung   • Cancer Brother         lung        ROS:     - Constitutional: Positive for mild fatigue but it gets better daily.    - HEENT:  Negative for vision changes, hearing changes, ear pain, ear discharge, rhinorrhea, sinus congestion, sore throat, and neck pain.      - Respiratory: Negative for cough, sputum production, chest congestion, dyspnea, wheezing, and crackles.      - Cardiovascular: Negative for chest pain, palpitations, orthopnea, and bilateral lower extremity edema.     - Gastrointestinal: Negative for heartburn, nausea, vomiting, abdominal pain, hematochezia, melena, diarrhea, constipation, and greasy/foul-smelling stools.     - Genitourinary: Negative for dysuria, polyuria, hematuria, pyuria, urinary urgency, and urinary incontinence.     - Musculoskeletal: Negative for myalgias, back pain, and joint pain.     - Skin: Negative for rash, itching, cyanotic skin color change.     - Neurological: Mild headache which is also getting better every day    - Endo/Heme/Allergies: Does not bruise/bleed easily.     - Psychiatric/Behavioral: Negative for depression, suicidal/homicidal ideation and memory loss.          - NOTE: All other systems reviewed and are negative, except as in HPI.      Exam:    /90   Pulse 91   Temp 35.9 °C (96.6 °F)   Ht 1.753 m (5' 9\")   Wt 112.5 kg " (248 lb)   SpO2 95%  Body mass index is 36.62 kg/m².    General:  Well nourished, well developed male in NAD  Head is grossly normal.  Neck: Supple without JVD or bruit. Thyroid is not enlarged.  Pulmonary: Clear to ausculation and percussion.  Normal effort. No rales, ronchi, or wheezing.  Cardiovascular: Regular rate and rhythm without murmur. Carotid and radial pulses are intact and equal bilaterally.  Extremities: no clubbing, cyanosis, or edema.  LABS: 10/20/2020: Results reviewed and discussed with the patient, questions answered.    Patient was seen for 45 minutes face to face of which, 40 minutes was spent counseling regarding his lab results and review of all of his history to try and determine next steps.  This patient needs an extensive work-up and we talked about that.      Please note that this dictation was created using voice recognition software. I have made every reasonable attempt to correct obvious errors, but I expect that there are errors of grammar and possibly content that I did not discover before finalizing the note.    Assessment/Plan:  1. Abnormal results of liver function studies  Uncontrolled, patient needs a CT scan of the abdomen and pelvis with and without as well as follow-up lab testing.  It may be that his liver functions were elevated secondary to a viral illness, but I think we need to rule out liver disease especially in light of his alkaline phosphatase being elevated.  - LDH; Future  - CREATINE KINASE; Future  - Comp Metabolic Panel; Future  - ALKALINE PHOSPHATASE ISOENZYMES; Future  - Prothrombin Time; Future  - CT-ABDOMEN WITH & W/O, PELVIS WITH; Future    2. Essential hypertension  Controlled, continue with current meds and lifestyle.      3. Other hyperlipidemia  Adequate control currently    4. Upper respiratory symptom  Resolved

## 2020-10-28 ENCOUNTER — HOSPITAL ENCOUNTER (OUTPATIENT)
Dept: LAB | Facility: MEDICAL CENTER | Age: 80
End: 2020-10-28
Attending: FAMILY MEDICINE
Payer: MEDICARE

## 2020-10-28 DIAGNOSIS — R94.5 ABNORMAL RESULTS OF LIVER FUNCTION STUDIES: ICD-10-CM

## 2020-10-28 LAB
INR PPP: 1.04 (ref 0.87–1.13)
PROTHROMBIN TIME: 13.9 SEC (ref 12–14.6)

## 2020-10-28 PROCEDURE — 84075 ASSAY ALKALINE PHOSPHATASE: CPT

## 2020-10-28 PROCEDURE — 82550 ASSAY OF CK (CPK): CPT

## 2020-10-28 PROCEDURE — 36415 COLL VENOUS BLD VENIPUNCTURE: CPT

## 2020-10-28 PROCEDURE — 80053 COMPREHEN METABOLIC PANEL: CPT

## 2020-10-28 PROCEDURE — 85610 PROTHROMBIN TIME: CPT

## 2020-10-28 PROCEDURE — 83615 LACTATE (LD) (LDH) ENZYME: CPT

## 2020-10-28 PROCEDURE — 84080 ASSAY ALKALINE PHOSPHATASES: CPT

## 2020-10-29 LAB
ALBUMIN SERPL BCP-MCNC: 3.4 G/DL (ref 3.2–4.9)
ALBUMIN/GLOB SERPL: 0.9 G/DL
ALP SERPL-CCNC: 348 U/L (ref 30–99)
ALT SERPL-CCNC: 426 U/L (ref 2–50)
ANION GAP SERPL CALC-SCNC: 11 MMOL/L (ref 7–16)
AST SERPL-CCNC: 451 U/L (ref 12–45)
BILIRUB SERPL-MCNC: 3.1 MG/DL (ref 0.1–1.5)
BUN SERPL-MCNC: 15 MG/DL (ref 8–22)
CALCIUM SERPL-MCNC: 8.6 MG/DL (ref 8.5–10.5)
CHLORIDE SERPL-SCNC: 104 MMOL/L (ref 96–112)
CK SERPL-CCNC: 76 U/L (ref 0–154)
CO2 SERPL-SCNC: 22 MMOL/L (ref 20–33)
CREAT SERPL-MCNC: 0.78 MG/DL (ref 0.5–1.4)
GLOBULIN SER CALC-MCNC: 3.9 G/DL (ref 1.9–3.5)
GLUCOSE SERPL-MCNC: 113 MG/DL (ref 65–99)
LDH SERPL L TO P-CCNC: 380 U/L (ref 107–266)
POTASSIUM SERPL-SCNC: 4.2 MMOL/L (ref 3.6–5.5)
PROT SERPL-MCNC: 7.3 G/DL (ref 6–8.2)
SODIUM SERPL-SCNC: 137 MMOL/L (ref 135–145)

## 2020-10-31 LAB
ALP BONE SERPL-CCNC: 71 U/L (ref 0–55)
ALP ISOS SERPL HS-CCNC: 0 U/L
ALP LIVER SERPL-CCNC: 303 U/L (ref 0–94)
ALP SERPL-CCNC: 374 U/L (ref 40–120)

## 2020-11-02 ENCOUNTER — HOSPITAL ENCOUNTER (OUTPATIENT)
Dept: RADIOLOGY | Facility: MEDICAL CENTER | Age: 80
End: 2020-11-02
Attending: FAMILY MEDICINE
Payer: MEDICARE

## 2020-11-02 ENCOUNTER — APPOINTMENT (OUTPATIENT)
Dept: RADIOLOGY | Facility: MEDICAL CENTER | Age: 80
End: 2020-11-02
Attending: FAMILY MEDICINE
Payer: COMMERCIAL

## 2020-11-02 DIAGNOSIS — R94.5 ABNORMAL RESULTS OF LIVER FUNCTION STUDIES: ICD-10-CM

## 2020-11-02 PROCEDURE — 74178 CT ABD&PLV WO CNTR FLWD CNTR: CPT

## 2020-11-02 PROCEDURE — 700117 HCHG RX CONTRAST REV CODE 255

## 2020-11-02 RX ADMIN — IOHEXOL 25 ML: 240 INJECTION, SOLUTION INTRATHECAL; INTRAVASCULAR; INTRAVENOUS; ORAL at 15:00

## 2020-11-02 RX ADMIN — IOHEXOL 100 ML: 350 INJECTION, SOLUTION INTRAVENOUS at 15:00

## 2020-11-03 RX ORDER — DEXAMETHASONE SODIUM PHOSPHATE 4 MG/ML
6 INJECTION, SOLUTION INTRA-ARTICULAR; INTRALESIONAL; INTRAMUSCULAR; INTRAVENOUS; SOFT TISSUE DAILY
Qty: 21 ML | Refills: 0 | Status: SHIPPED | OUTPATIENT
Start: 2020-11-03 | End: 2020-11-05

## 2020-11-05 RX ORDER — DEXAMETHASONE 4 MG/1
6 TABLET ORAL DAILY
Qty: 21 TAB | Refills: 0 | Status: SHIPPED | OUTPATIENT
Start: 2020-11-05 | End: 2020-11-19

## 2020-11-23 DIAGNOSIS — R94.5 ABNORMAL RESULTS OF LIVER FUNCTION STUDIES: ICD-10-CM

## 2020-11-24 ENCOUNTER — HOSPITAL ENCOUNTER (OUTPATIENT)
Dept: LAB | Facility: MEDICAL CENTER | Age: 80
End: 2020-11-24
Attending: FAMILY MEDICINE
Payer: MEDICARE

## 2020-11-24 DIAGNOSIS — R94.5 ABNORMAL RESULTS OF LIVER FUNCTION STUDIES: ICD-10-CM

## 2020-11-24 LAB
ALBUMIN SERPL BCP-MCNC: 3.5 G/DL (ref 3.2–4.9)
ALBUMIN/GLOB SERPL: 1.2 G/DL
ALP SERPL-CCNC: 91 U/L (ref 30–99)
ALT SERPL-CCNC: 43 U/L (ref 2–50)
ANION GAP SERPL CALC-SCNC: 8 MMOL/L (ref 7–16)
AST SERPL-CCNC: 40 U/L (ref 12–45)
BILIRUB SERPL-MCNC: 1.2 MG/DL (ref 0.1–1.5)
BUN SERPL-MCNC: 14 MG/DL (ref 8–22)
CALCIUM SERPL-MCNC: 8.4 MG/DL (ref 8.5–10.5)
CHLORIDE SERPL-SCNC: 107 MMOL/L (ref 96–112)
CO2 SERPL-SCNC: 22 MMOL/L (ref 20–33)
CREAT SERPL-MCNC: 0.79 MG/DL (ref 0.5–1.4)
GLOBULIN SER CALC-MCNC: 2.9 G/DL (ref 1.9–3.5)
GLUCOSE SERPL-MCNC: 117 MG/DL (ref 65–99)
POTASSIUM SERPL-SCNC: 4.5 MMOL/L (ref 3.6–5.5)
PROT SERPL-MCNC: 6.4 G/DL (ref 6–8.2)
SODIUM SERPL-SCNC: 137 MMOL/L (ref 135–145)

## 2020-11-24 PROCEDURE — 80053 COMPREHEN METABOLIC PANEL: CPT

## 2020-11-24 PROCEDURE — 36415 COLL VENOUS BLD VENIPUNCTURE: CPT

## 2020-11-25 ENCOUNTER — TELEMEDICINE (OUTPATIENT)
Dept: MEDICAL GROUP | Facility: PHYSICIAN GROUP | Age: 80
End: 2020-11-25
Payer: MEDICARE

## 2020-11-25 VITALS — WEIGHT: 248 LBS | BODY MASS INDEX: 36.73 KG/M2 | HEIGHT: 69 IN | RESPIRATION RATE: 20 BRPM | HEART RATE: 90 BPM

## 2020-11-25 DIAGNOSIS — R94.5 ABNORMAL RESULTS OF LIVER FUNCTION STUDIES: ICD-10-CM

## 2020-11-25 DIAGNOSIS — U07.1 COVID-19: ICD-10-CM

## 2020-11-25 PROCEDURE — 99214 OFFICE O/P EST MOD 30 MIN: CPT | Mod: 95,CR | Performed by: FAMILY MEDICINE

## 2020-11-25 RX ORDER — DEXAMETHASONE 4 MG/1
6 TABLET ORAL DAILY
Qty: 15 TAB | Refills: 0 | Status: SHIPPED
Start: 2020-11-25 | End: 2020-12-05

## 2020-11-25 ASSESSMENT — FIBROSIS 4 INDEX: FIB4 SCORE: 3.11

## 2020-11-25 NOTE — ASSESSMENT & PLAN NOTE
This patient was ill with fever, chills and shortness of breath on 1015/20 and saw Marylou.  Was sent to the emergency room at [facility] to have a COVID-19 test which came back negative on the rapid test.  I believe that was a false negative.  I then saw him on the 23rd and he was continuing to have abnormal labs with elevated liver functions the same as someone who has a viral infection.  He continues to this day feeling short of breath, having a dry cough along with myalgias and arthralgias and has lost his sense of taste and smell.  After talking with he and his wife we decided were going to treat him as a Covid positive patient.  We put him on dexamethasone 6 mg daily for 10 days, zinc 50 mg daily, vitamin D 5000 units daily and vitamin C 500 mg daily.  I have asked his wife to text me daily to let me know how he is doing.  They are also going to  an O2 sat monitor so that we can be certain that he is keeping his oxygen level above 90%.

## 2020-11-25 NOTE — PROGRESS NOTES
This evaluation was conducted via Zoom using secure and encrypted videoconferencing technology. The patient was in a private location in the [facility].    The patient's identity was confirmed and verbal consent was obtained for this virtual visit.      CC:Diagnoses of COVID-19 and Abnormal results of liver function studies were pertinent to this visit.    HISTORY OF PRESENT ILLNESS: Patient is a 80 y.o. male established patient who presents today to talk about his previously abnormal.      COVID-19  This patient was ill with fever, chills and shortness of breath on 1015/20 and saw Marylou.  Was sent to the emergency room at [facility] to have a COVID-19 test which came back negative on the rapid test.  I believe that was a false negative.  I then saw him on the 23rd and he was continuing to have abnormal labs with elevated liver functions the same as someone who has a viral infection.  He continues to this day feeling short of breath, having a dry cough along with myalgias and arthralgias and has lost his sense of taste and smell.  After talking with he and his wife we decided were going to treat him as a Covid positive patient.  We put him on dexamethasone 6 mg daily for 10 days, zinc 50 mg daily, vitamin D 5000 units daily and vitamin C 500 mg daily.  I have asked his wife to text me daily to let me know how he is doing.  They are also going to  an O2 sat monitor so that we can be certain that he is keeping his oxygen level above 90%.    Abnormal results of liver function studies  This was a problem for the patient back on 10/15 and 10/23.  He just did new blood work for me as of 11/24/2020 and his liver functions are all in the normal range.  His blood sugar slightly elevated at 117, his wife states that he has not had that previously.  We will follow and work that up      Patient Active Problem List    Diagnosis Date Noted   • COVID-19 11/25/2020   • Abnormal results of liver function studies  10/23/2020   • Upper respiratory symptom 10/15/2020   • Lump 2020   • Screening for colon cancer 2020   • At risk for obstructive sleep apnea 2019   • Essential hypertension 10/11/2019   • Other hyperlipidemia 10/11/2019   • Coronary artery disease involving native coronary artery of native heart without angina pectoris 2019   • Obesity (BMI 35.0-39.9 without comorbidity) 2019      Allergies:Ciprofloxacin hcl    Current Outpatient Medications   Medication Sig Dispense Refill   • dexamethasone (DECADRON) 4 MG Tab Take 1.5 Tabs by mouth every day for 10 days. 15 Tab 0   • FLUAD QUADRIVALENT 0.5 ML Prefilled Syringe PHARMACY ADMINISTERED     • ibuprofen (MOTRIN) 800 MG Tab Take 1 Tab by mouth every 8 hours as needed for Moderate Pain or Inflammation. 90 Tab 1   • Magnesium 250 MG Tab Take  by mouth every day.     • ascorbic acid (ASCORBIC ACID) 500 MG Tab Take 500 mg by mouth every day.     • Fexofenadine HCl (ALLERGY 24-HR PO) Take  by mouth every day.     • lisinopril (PRINIVIL) 5 MG Tab TAKE 1 TABLET BY MOUTH  EVERY DAY 90 Tab 1   • atorvastatin (LIPITOR) 80 MG tablet Take 1 Tab by mouth every day. 90 Tab 3   • Multiple Vitamins-Minerals (OCUVITE-LUTEIN) Tab Take 1 tablet by mouth every day.     • Cholecalciferol (VITAMIN D3) 3000 units Tab Take  by mouth.     • aspirin 81 MG EC tablet Take 1 Tab by mouth every day. 90 Tab 3     No current facility-administered medications for this visit.        Social History     Tobacco Use   • Smoking status: Former Smoker     Packs/day: 0.25     Years: 20.00     Pack years: 5.00     Types: Cigarettes     Quit date:      Years since quittin.9   • Smokeless tobacco: Never Used   Substance Use Topics   • Alcohol use: Not Currently     Comment: wine   • Drug use: Never     Social History     Social History Narrative   • Not on file       Family History   Problem Relation Age of Onset   • Other Mother         dementia   • Alcohol abuse Father   "  • Other Brother         dementia   • Other Sister         Alzheimer's   • Other Sister         Alzheimer's   • Cancer Brother         lung   • Cancer Brother         lung   • Cancer Brother         lung        ROS:     - Constitutional: Positive for fatigue and malaise along with generalized weakness.    - HEENT: Positive for headache..      - Respiratory:Positive for dry cough and SOB with activity.      - Cardiovascular: Negative for chest pain, palpitations, orthopnea, and bilateral lower extremity edema.     - Gastrointestinal: Negative for heartburn, nausea, vomiting, abdominal pain, hematochezia, melena, diarrhea, constipation, and greasy/foul-smelling stools.     - Genitourinary: Negative for dysuria, polyuria, hematuria, pyuria, urinary urgency, and urinary incontinence.     - Musculoskeletal:Positive for Myalgias and Arthralgias generalized.     - Skin: Negative for rash, itching, cyanotic skin color change.     - Neurological: Negative for dizziness, tingling, tremors, focal sensory deficit, focal weakness and headaches.     - Endo/Heme/Allergies: Does not bruise/bleed easily.     - Psychiatric/Behavioral: Negative for depression, suicidal/homicidal ideation and memory loss.          - NOTE: All other systems reviewed and are negative, except as in HPI.      Exam:    Pulse 90   Resp 20   Ht 1.753 m (5' 9\")   Wt 112.5 kg (248 lb)  Body mass index is 36.62 kg/m².    General:  Well nourished, well developed male in NAD  Head is grossly normal.  Patient was seen for 25 minutes face to face of which, 20 minutes was spent counseling regarding discussion of symptoms and my belief that he has Covid.  Treatment options and how to monitor his symptoms..    LABS: 11/24/2020: Results reviewed and discussed with the patient, questions answered.    Please note that this dictation was created using voice recognition software. I have made every reasonable attempt to correct obvious errors, but I expect that there are " errors of grammar and possibly content that I did not discover before finalizing the note.    Assessment/Plan:  1. COVID-19  Uncontrolled, working on rest and hydration as well as meds and supplements as above.      2. Abnormal results of liver function studies  Resolved

## 2020-11-25 NOTE — ASSESSMENT & PLAN NOTE
This was a problem for the patient back on 10/15 and 10/23.  He just did new blood work for me as of 11/24/2020 and his liver functions are all in the normal range.  His blood sugar slightly elevated at 117, his wife states that he has not had that previously.  We will follow and work that up

## 2020-12-09 DIAGNOSIS — I25.10 CORONARY ARTERY DISEASE INVOLVING NATIVE CORONARY ARTERY OF NATIVE HEART WITHOUT ANGINA PECTORIS: ICD-10-CM

## 2020-12-09 DIAGNOSIS — I10 ESSENTIAL HYPERTENSION: ICD-10-CM

## 2020-12-09 NOTE — TELEPHONE ENCOUNTER
Received request via: Pharmacy    Was the patient seen in the last year in this department? Yes    Does the patient have an active prescription (recently filled or refills available) for medication(s) requested? requesting 1 year supply

## 2020-12-10 RX ORDER — LISINOPRIL 5 MG/1
TABLET ORAL
Qty: 90 TAB | Refills: 3 | Status: SHIPPED | OUTPATIENT
Start: 2020-12-10 | End: 2021-01-01 | Stop reason: SDUPTHER

## 2020-12-10 RX ORDER — ATORVASTATIN CALCIUM 80 MG/1
TABLET, FILM COATED ORAL
Qty: 90 TAB | Refills: 3 | Status: SHIPPED | OUTPATIENT
Start: 2020-12-10 | End: 2021-01-01 | Stop reason: SDUPTHER

## 2020-12-16 ENCOUNTER — PATIENT MESSAGE (OUTPATIENT)
Dept: MEDICAL GROUP | Facility: PHYSICIAN GROUP | Age: 80
End: 2020-12-16

## 2020-12-16 DIAGNOSIS — E78.49 OTHER HYPERLIPIDEMIA: ICD-10-CM

## 2020-12-17 ENCOUNTER — HOSPITAL ENCOUNTER (OUTPATIENT)
Dept: LAB | Facility: MEDICAL CENTER | Age: 80
End: 2020-12-17
Attending: FAMILY MEDICINE
Payer: MEDICARE

## 2020-12-17 DIAGNOSIS — E78.49 OTHER HYPERLIPIDEMIA: ICD-10-CM

## 2020-12-17 LAB
ALBUMIN SERPL BCP-MCNC: 3.6 G/DL (ref 3.2–4.9)
ALBUMIN/GLOB SERPL: 1.2 G/DL
ALP SERPL-CCNC: 62 U/L (ref 30–99)
ALT SERPL-CCNC: 28 U/L (ref 2–50)
ANION GAP SERPL CALC-SCNC: 10 MMOL/L (ref 7–16)
AST SERPL-CCNC: 29 U/L (ref 12–45)
BILIRUB SERPL-MCNC: 0.7 MG/DL (ref 0.1–1.5)
BUN SERPL-MCNC: 18 MG/DL (ref 8–22)
CALCIUM SERPL-MCNC: 8.8 MG/DL (ref 8.5–10.5)
CHLORIDE SERPL-SCNC: 110 MMOL/L (ref 96–112)
CHOLEST SERPL-MCNC: 263 MG/DL (ref 100–199)
CO2 SERPL-SCNC: 23 MMOL/L (ref 20–33)
CREAT SERPL-MCNC: 0.73 MG/DL (ref 0.5–1.4)
FASTING STATUS PATIENT QL REPORTED: NORMAL
GLOBULIN SER CALC-MCNC: 3 G/DL (ref 1.9–3.5)
GLUCOSE SERPL-MCNC: 119 MG/DL (ref 65–99)
HDLC SERPL-MCNC: 50 MG/DL
LDLC SERPL CALC-MCNC: 151 MG/DL
POTASSIUM SERPL-SCNC: 4.4 MMOL/L (ref 3.6–5.5)
PROT SERPL-MCNC: 6.6 G/DL (ref 6–8.2)
SODIUM SERPL-SCNC: 143 MMOL/L (ref 135–145)
TRIGL SERPL-MCNC: 309 MG/DL (ref 0–149)

## 2020-12-17 PROCEDURE — 36415 COLL VENOUS BLD VENIPUNCTURE: CPT

## 2020-12-17 PROCEDURE — 80061 LIPID PANEL: CPT

## 2020-12-17 PROCEDURE — 80053 COMPREHEN METABOLIC PANEL: CPT

## 2021-01-01 ENCOUNTER — OFFICE VISIT (OUTPATIENT)
Dept: URGENT CARE | Facility: CLINIC | Age: 81
End: 2021-01-01
Payer: MEDICARE

## 2021-01-01 ENCOUNTER — APPOINTMENT (OUTPATIENT)
Dept: URGENT CARE | Facility: CLINIC | Age: 81
End: 2021-01-01
Payer: MEDICARE

## 2021-01-01 ENCOUNTER — OFFICE VISIT (OUTPATIENT)
Dept: HEMATOLOGY ONCOLOGY | Facility: MEDICAL CENTER | Age: 81
End: 2021-01-01
Payer: MEDICARE

## 2021-01-01 ENCOUNTER — OFFICE VISIT (OUTPATIENT)
Dept: CARDIOLOGY | Facility: MEDICAL CENTER | Age: 81
End: 2021-01-01
Payer: MEDICARE

## 2021-01-01 ENCOUNTER — IMMUNIZATION (OUTPATIENT)
Dept: FAMILY PLANNING/WOMEN'S HEALTH CLINIC | Facility: IMMUNIZATION CENTER | Age: 81
End: 2021-01-01
Attending: INTERNAL MEDICINE
Payer: MEDICARE

## 2021-01-01 ENCOUNTER — OFFICE VISIT (OUTPATIENT)
Dept: MEDICAL GROUP | Facility: MEDICAL CENTER | Age: 81
End: 2021-01-01
Payer: MEDICARE

## 2021-01-01 ENCOUNTER — HOSPITAL ENCOUNTER (OUTPATIENT)
Dept: RADIOLOGY | Facility: MEDICAL CENTER | Age: 81
End: 2021-07-23
Attending: INTERNAL MEDICINE
Payer: MEDICARE

## 2021-01-01 ENCOUNTER — HOSPITAL ENCOUNTER (OUTPATIENT)
Dept: CARDIOLOGY | Facility: MEDICAL CENTER | Age: 81
End: 2021-04-01
Attending: INTERNAL MEDICINE
Payer: MEDICARE

## 2021-01-01 ENCOUNTER — HOSPITAL ENCOUNTER (OUTPATIENT)
Dept: LAB | Facility: MEDICAL CENTER | Age: 81
End: 2021-11-09
Attending: INTERNAL MEDICINE
Payer: MEDICARE

## 2021-01-01 ENCOUNTER — APPOINTMENT (OUTPATIENT)
Dept: RADIOLOGY | Facility: IMAGING CENTER | Age: 81
End: 2021-01-01
Attending: NURSE PRACTITIONER
Payer: MEDICARE

## 2021-01-01 ENCOUNTER — HOSPITAL ENCOUNTER (OUTPATIENT)
Dept: LAB | Facility: MEDICAL CENTER | Age: 81
End: 2021-06-07
Attending: INTERNAL MEDICINE
Payer: MEDICARE

## 2021-01-01 ENCOUNTER — HOSPITAL ENCOUNTER (OUTPATIENT)
Dept: LAB | Facility: MEDICAL CENTER | Age: 81
End: 2021-06-22
Attending: INTERNAL MEDICINE
Payer: MEDICARE

## 2021-01-01 ENCOUNTER — HOSPITAL ENCOUNTER (OUTPATIENT)
Dept: LAB | Facility: MEDICAL CENTER | Age: 81
End: 2021-05-25
Attending: PHYSICIAN ASSISTANT
Payer: MEDICARE

## 2021-01-01 ENCOUNTER — HOSPITAL ENCOUNTER (OUTPATIENT)
Dept: LAB | Facility: MEDICAL CENTER | Age: 81
End: 2021-07-27
Attending: INTERNAL MEDICINE
Payer: MEDICARE

## 2021-01-01 VITALS
BODY MASS INDEX: 37.37 KG/M2 | SYSTOLIC BLOOD PRESSURE: 132 MMHG | OXYGEN SATURATION: 97 % | HEIGHT: 69 IN | WEIGHT: 252.3 LBS | HEART RATE: 69 BPM | DIASTOLIC BLOOD PRESSURE: 90 MMHG | RESPIRATION RATE: 12 BRPM

## 2021-01-01 VITALS
BODY MASS INDEX: 35.25 KG/M2 | SYSTOLIC BLOOD PRESSURE: 126 MMHG | HEIGHT: 69 IN | WEIGHT: 238 LBS | OXYGEN SATURATION: 97 % | HEART RATE: 63 BPM | DIASTOLIC BLOOD PRESSURE: 64 MMHG | RESPIRATION RATE: 14 BRPM

## 2021-01-01 VITALS
TEMPERATURE: 97.8 F | WEIGHT: 243.7 LBS | RESPIRATION RATE: 16 BRPM | SYSTOLIC BLOOD PRESSURE: 130 MMHG | BODY MASS INDEX: 36.09 KG/M2 | OXYGEN SATURATION: 97 % | DIASTOLIC BLOOD PRESSURE: 86 MMHG | HEART RATE: 72 BPM | HEIGHT: 69 IN

## 2021-01-01 VITALS
HEART RATE: 82 BPM | HEIGHT: 69 IN | OXYGEN SATURATION: 97 % | RESPIRATION RATE: 16 BRPM | TEMPERATURE: 98.6 F | BODY MASS INDEX: 36.54 KG/M2 | DIASTOLIC BLOOD PRESSURE: 78 MMHG | WEIGHT: 246.69 LBS | SYSTOLIC BLOOD PRESSURE: 142 MMHG

## 2021-01-01 VITALS
RESPIRATION RATE: 16 BRPM | BODY MASS INDEX: 36.14 KG/M2 | WEIGHT: 244 LBS | SYSTOLIC BLOOD PRESSURE: 134 MMHG | OXYGEN SATURATION: 97 % | DIASTOLIC BLOOD PRESSURE: 92 MMHG | HEIGHT: 69 IN | TEMPERATURE: 97.9 F | HEART RATE: 71 BPM

## 2021-01-01 VITALS
RESPIRATION RATE: 16 BRPM | BODY MASS INDEX: 36.28 KG/M2 | HEART RATE: 67 BPM | HEIGHT: 69 IN | TEMPERATURE: 97.9 F | SYSTOLIC BLOOD PRESSURE: 138 MMHG | WEIGHT: 244.93 LBS | DIASTOLIC BLOOD PRESSURE: 82 MMHG | OXYGEN SATURATION: 95 %

## 2021-01-01 VITALS
SYSTOLIC BLOOD PRESSURE: 132 MMHG | DIASTOLIC BLOOD PRESSURE: 84 MMHG | TEMPERATURE: 97.3 F | OXYGEN SATURATION: 96 % | RESPIRATION RATE: 14 BRPM | HEIGHT: 69 IN | WEIGHT: 242.51 LBS | HEART RATE: 65 BPM | BODY MASS INDEX: 35.92 KG/M2

## 2021-01-01 VITALS
TEMPERATURE: 97.8 F | DIASTOLIC BLOOD PRESSURE: 82 MMHG | HEART RATE: 87 BPM | BODY MASS INDEX: 35.82 KG/M2 | SYSTOLIC BLOOD PRESSURE: 122 MMHG | OXYGEN SATURATION: 95 % | HEIGHT: 69 IN | RESPIRATION RATE: 16 BRPM | WEIGHT: 241.84 LBS

## 2021-01-01 VITALS
BODY MASS INDEX: 36.24 KG/M2 | WEIGHT: 244.71 LBS | DIASTOLIC BLOOD PRESSURE: 84 MMHG | HEIGHT: 69 IN | SYSTOLIC BLOOD PRESSURE: 128 MMHG | HEART RATE: 75 BPM | TEMPERATURE: 97.6 F | OXYGEN SATURATION: 97 % | RESPIRATION RATE: 18 BRPM

## 2021-01-01 VITALS
OXYGEN SATURATION: 97 % | RESPIRATION RATE: 16 BRPM | TEMPERATURE: 97.8 F | HEART RATE: 85 BPM | HEIGHT: 69 IN | WEIGHT: 244.71 LBS | DIASTOLIC BLOOD PRESSURE: 74 MMHG | BODY MASS INDEX: 36.24 KG/M2 | SYSTOLIC BLOOD PRESSURE: 120 MMHG

## 2021-01-01 VITALS
OXYGEN SATURATION: 96 % | BODY MASS INDEX: 37.55 KG/M2 | WEIGHT: 253.53 LBS | TEMPERATURE: 98.1 F | HEIGHT: 69 IN | HEART RATE: 79 BPM | SYSTOLIC BLOOD PRESSURE: 130 MMHG | DIASTOLIC BLOOD PRESSURE: 82 MMHG

## 2021-01-01 DIAGNOSIS — I25.10 CORONARY ARTERY DISEASE INVOLVING NATIVE CORONARY ARTERY OF NATIVE HEART WITHOUT ANGINA PECTORIS: ICD-10-CM

## 2021-01-01 DIAGNOSIS — G47.33 OBSTRUCTIVE SLEEP APNEA SYNDROME: ICD-10-CM

## 2021-01-01 DIAGNOSIS — D69.6 THROMBOCYTOPENIA (HCC): ICD-10-CM

## 2021-01-01 DIAGNOSIS — Z12.5 PROSTATE CANCER SCREENING: ICD-10-CM

## 2021-01-01 DIAGNOSIS — D75.89 MACROCYTOSIS WITHOUT ANEMIA: ICD-10-CM

## 2021-01-01 DIAGNOSIS — I10 ESSENTIAL HYPERTENSION: ICD-10-CM

## 2021-01-01 DIAGNOSIS — D75.89 MACROCYTOSIS: ICD-10-CM

## 2021-01-01 DIAGNOSIS — Z12.83 SKIN CANCER SCREENING: ICD-10-CM

## 2021-01-01 DIAGNOSIS — R73.01 IMPAIRED FASTING GLUCOSE: ICD-10-CM

## 2021-01-01 DIAGNOSIS — S29.9XXA TRAUMATIC INJURY OF RIB: ICD-10-CM

## 2021-01-01 DIAGNOSIS — Z23 NEED FOR VACCINATION: ICD-10-CM

## 2021-01-01 DIAGNOSIS — I10 HYPERTENSION, ESSENTIAL: ICD-10-CM

## 2021-01-01 DIAGNOSIS — K82.4 GALLBLADDER POLYP: ICD-10-CM

## 2021-01-01 DIAGNOSIS — T30.0 BURN: ICD-10-CM

## 2021-01-01 DIAGNOSIS — D47.2 MGUS (MONOCLONAL GAMMOPATHY OF UNKNOWN SIGNIFICANCE): ICD-10-CM

## 2021-01-01 DIAGNOSIS — E78.49 OTHER HYPERLIPIDEMIA: ICD-10-CM

## 2021-01-01 DIAGNOSIS — R21 RASH: ICD-10-CM

## 2021-01-01 DIAGNOSIS — R74.8 ELEVATED SERUM GGT LEVEL: ICD-10-CM

## 2021-01-01 DIAGNOSIS — M25.561 CHRONIC PAIN OF RIGHT KNEE: ICD-10-CM
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
## 2021-01-01 DIAGNOSIS — Z23 ENCOUNTER FOR VACCINATION: Primary | ICD-10-CM

## 2021-01-01 DIAGNOSIS — E78.5 DYSLIPIDEMIA: ICD-10-CM

## 2021-01-01 DIAGNOSIS — G47.62 NOCTURNAL LEG CRAMPS: ICD-10-CM

## 2021-01-01 DIAGNOSIS — R94.5 ABNORMAL RESULTS OF LIVER FUNCTION STUDIES: ICD-10-CM

## 2021-01-01 DIAGNOSIS — L98.9 SKIN LESION OF FACE: ICD-10-CM

## 2021-01-01 DIAGNOSIS — E66.9 OBESITY (BMI 35.0-39.9 WITHOUT COMORBIDITY): ICD-10-CM

## 2021-01-01 DIAGNOSIS — S29.011A CHEST WALL MUSCLE STRAIN, INITIAL ENCOUNTER: ICD-10-CM

## 2021-01-01 DIAGNOSIS — G89.29 CHRONIC PAIN OF RIGHT KNEE: ICD-10-CM

## 2021-01-01 DIAGNOSIS — I10 WHITE COAT SYNDROME WITH DIAGNOSIS OF HYPERTENSION: ICD-10-CM

## 2021-01-01 LAB
ALBUMIN SERPL BCP-MCNC: 3.7 G/DL (ref 3.2–4.9)
ALBUMIN SERPL BCP-MCNC: 4.1 G/DL (ref 3.2–4.9)
ALBUMIN SERPL BCP-MCNC: 4.1 G/DL (ref 3.2–4.9)
ALBUMIN SERPL ELPH-MCNC: 3.65 G/DL (ref 3.75–5.01)
ALBUMIN SERPL ELPH-MCNC: 3.73 G/DL (ref 3.75–5.01)
ALBUMIN/GLOB SERPL: 1.1 G/DL
ALBUMIN/GLOB SERPL: 1.3 G/DL
ALP BONE SERPL-CCNC: 44 U/L (ref 0–55)
ALP ISOS SERPL HS-CCNC: 0 U/L
ALP LIVER SERPL-CCNC: 58 U/L (ref 0–94)
ALP SERPL-CCNC: 102 U/L (ref 40–120)
ALP SERPL-CCNC: 111 U/L (ref 30–99)
ALP SERPL-CCNC: 80 U/L (ref 30–99)
ALP SERPL-CCNC: 81 U/L (ref 30–99)
ALPHA1 GLOB SERPL ELPH-MCNC: 0.27 G/DL (ref 0.19–0.46)
ALPHA1 GLOB SERPL ELPH-MCNC: 0.3 G/DL (ref 0.19–0.46)
ALPHA2 GLOB SERPL ELPH-MCNC: 0.73 G/DL (ref 0.48–1.05)
ALPHA2 GLOB SERPL ELPH-MCNC: 0.77 G/DL (ref 0.48–1.05)
ALT SERPL-CCNC: 67 U/L (ref 2–50)
ALT SERPL-CCNC: 68 U/L (ref 2–50)
ALT SERPL-CCNC: 68 U/L (ref 2–50)
ANION GAP SERPL CALC-SCNC: 11 MMOL/L (ref 7–16)
ANION GAP SERPL CALC-SCNC: 8 MMOL/L (ref 7–16)
AST SERPL-CCNC: 89 U/L (ref 12–45)
AST SERPL-CCNC: 90 U/L (ref 12–45)
AST SERPL-CCNC: 94 U/L (ref 12–45)
B-GLOBULIN SERPL ELPH-MCNC: 0.85 G/DL (ref 0.48–1.1)
B-GLOBULIN SERPL ELPH-MCNC: 0.92 G/DL (ref 0.48–1.1)
BASOPHILS # BLD AUTO: 0.6 % (ref 0–1.8)
BASOPHILS # BLD AUTO: 0.7 % (ref 0–1.8)
BASOPHILS # BLD AUTO: 0.9 % (ref 0–1.8)
BASOPHILS # BLD AUTO: 0.9 % (ref 0–1.8)
BASOPHILS # BLD: 0.04 K/UL (ref 0–0.12)
BASOPHILS # BLD: 0.04 K/UL (ref 0–0.12)
BASOPHILS # BLD: 0.05 K/UL (ref 0–0.12)
BASOPHILS # BLD: 0.05 K/UL (ref 0–0.12)
BILIRUB CONJ SERPL-MCNC: <0.2 MG/DL (ref 0.1–0.5)
BILIRUB INDIRECT SERPL-MCNC: ABNORMAL MG/DL (ref 0–1)
BILIRUB SERPL-MCNC: 0.6 MG/DL (ref 0.1–1.5)
BILIRUB SERPL-MCNC: 0.6 MG/DL (ref 0.1–1.5)
BILIRUB SERPL-MCNC: 1.1 MG/DL (ref 0.1–1.5)
BUN SERPL-MCNC: 13 MG/DL (ref 8–22)
BUN SERPL-MCNC: 16 MG/DL (ref 8–22)
CALCIUM SERPL-MCNC: 8.9 MG/DL (ref 8.5–10.5)
CALCIUM SERPL-MCNC: 9 MG/DL (ref 8.5–10.5)
CCP IGG SERPL-ACNC: 3 UNITS (ref 0–19)
CHLORIDE SERPL-SCNC: 104 MMOL/L (ref 96–112)
CHLORIDE SERPL-SCNC: 105 MMOL/L (ref 96–112)
CHOLEST SERPL-MCNC: 159 MG/DL (ref 100–199)
CO2 SERPL-SCNC: 22 MMOL/L (ref 20–33)
CO2 SERPL-SCNC: 25 MMOL/L (ref 20–33)
CREAT SERPL-MCNC: 0.71 MG/DL (ref 0.5–1.4)
CREAT SERPL-MCNC: 0.79 MG/DL (ref 0.5–1.4)
CRP SERPL HS-MCNC: 0.34 MG/DL (ref 0–0.75)
EER MONOCLONAL PROTEIN AND FLC, SERUM Q5224: ABNORMAL
EER MONOCLONAL PROTEIN AND FLC, SERUM Q5224: ABNORMAL
EOSINOPHIL # BLD AUTO: 0.18 K/UL (ref 0–0.51)
EOSINOPHIL # BLD AUTO: 0.19 K/UL (ref 0–0.51)
EOSINOPHIL # BLD AUTO: 0.2 K/UL (ref 0–0.51)
EOSINOPHIL # BLD AUTO: 0.32 K/UL (ref 0–0.51)
EOSINOPHIL NFR BLD: 3.2 % (ref 0–6.9)
EOSINOPHIL NFR BLD: 3.2 % (ref 0–6.9)
EOSINOPHIL NFR BLD: 3.4 % (ref 0–6.9)
EOSINOPHIL NFR BLD: 6 % (ref 0–6.9)
ERYTHROCYTE [DISTWIDTH] IN BLOOD BY AUTOMATED COUNT: 48.7 FL (ref 35.9–50)
ERYTHROCYTE [DISTWIDTH] IN BLOOD BY AUTOMATED COUNT: 49.3 FL (ref 35.9–50)
ERYTHROCYTE [DISTWIDTH] IN BLOOD BY AUTOMATED COUNT: 55 FL (ref 35.9–50)
ERYTHROCYTE [DISTWIDTH] IN BLOOD BY AUTOMATED COUNT: 55.2 FL (ref 35.9–50)
ERYTHROCYTE [SEDIMENTATION RATE] IN BLOOD BY WESTERGREN METHOD: 9 MM/HOUR (ref 0–20)
EST. AVERAGE GLUCOSE BLD GHB EST-MCNC: 108 MG/DL
FASTING STATUS PATIENT QL REPORTED: NORMAL
FERRITIN SERPL-MCNC: 170 NG/ML (ref 22–322)
FOLATE SERPL-MCNC: 11.1 NG/ML
GAMMA GLOB SERPL ELPH-MCNC: 1.31 G/DL (ref 0.62–1.51)
GAMMA GLOB SERPL ELPH-MCNC: 1.37 G/DL (ref 0.62–1.51)
GGT SERPL-CCNC: 156 U/L (ref 7–51)
GGT SERPL-CCNC: 211 U/L (ref 7–51)
GLOBULIN SER CALC-MCNC: 3.2 G/DL (ref 1.9–3.5)
GLOBULIN SER CALC-MCNC: 3.5 G/DL (ref 1.9–3.5)
GLUCOSE SERPL-MCNC: 112 MG/DL (ref 65–99)
GLUCOSE SERPL-MCNC: 94 MG/DL (ref 65–99)
HAPTOGLOB SERPL-MCNC: 72 MG/DL (ref 30–200)
HBA1C MFR BLD: 5.4 % (ref 4–5.6)
HBV CORE IGM SER QL: NORMAL
HBV E AB SERPL QL IA: NEGATIVE
HBV E AG SERPL QL IA: NEGATIVE
HBV SURFACE AB SERPL IA-ACNC: <3.5 MIU/ML (ref 0–10)
HBV SURFACE AG SER QL: NORMAL
HCT VFR BLD AUTO: 44.7 % (ref 42–52)
HCT VFR BLD AUTO: 44.8 % (ref 42–52)
HCT VFR BLD AUTO: 45.7 % (ref 42–52)
HCT VFR BLD AUTO: 47.5 % (ref 42–52)
HCV AB SER QL: NORMAL
HDLC SERPL-MCNC: 49 MG/DL
HGB BLD-MCNC: 14.6 G/DL (ref 14–18)
HGB BLD-MCNC: 14.9 G/DL (ref 14–18)
HGB BLD-MCNC: 15.3 G/DL (ref 14–18)
HGB BLD-MCNC: 15.4 G/DL (ref 14–18)
HGB RETIC QN AUTO: 35.3 PG/CELL (ref 29–35)
IGA SERPL-MCNC: 402 MG/DL (ref 68–408)
IGA SERPL-MCNC: 406 MG/DL (ref 68–408)
IGA SERPL-MCNC: 437 MG/DL (ref 68–408)
IGG SERPL-MCNC: 1333 MG/DL (ref 768–1632)
IGG SERPL-MCNC: 1339 MG/DL (ref 768–1632)
IGG SERPL-MCNC: 1495 MG/DL (ref 768–1632)
IGM SERPL-MCNC: 83 MG/DL (ref 35–263)
IGM SERPL-MCNC: 85 MG/DL (ref 35–263)
IMM GRANULOCYTES # BLD AUTO: 0.01 K/UL (ref 0–0.11)
IMM GRANULOCYTES # BLD AUTO: 0.01 K/UL (ref 0–0.11)
IMM GRANULOCYTES # BLD AUTO: 0.02 K/UL (ref 0–0.11)
IMM GRANULOCYTES # BLD AUTO: 0.02 K/UL (ref 0–0.11)
IMM GRANULOCYTES NFR BLD AUTO: 0.2 % (ref 0–0.9)
IMM GRANULOCYTES NFR BLD AUTO: 0.2 % (ref 0–0.9)
IMM GRANULOCYTES NFR BLD AUTO: 0.3 % (ref 0–0.9)
IMM GRANULOCYTES NFR BLD AUTO: 0.3 % (ref 0–0.9)
IMM RETICS NFR: 6.4 % (ref 9.3–17.4)
INTERPRETATION SERPL IFE-IMP: ABNORMAL
IRON SATN MFR SERPL: 44 % (ref 15–55)
IRON SERPL-MCNC: 142 UG/DL (ref 50–180)
KAPPA LC FREE SER-MCNC: 29.73 MG/L (ref 3.3–19.4)
KAPPA LC FREE SER-MCNC: 31.53 MG/L (ref 3.3–19.4)
KAPPA LC FREE/LAMBDA FREE SER NEPH: 1.27 {RATIO} (ref 0.26–1.65)
KAPPA LC FREE/LAMBDA FREE SER NEPH: 1.41 {RATIO} (ref 0.26–1.65)
LAMBDA LC FREE SERPL-MCNC: 22.32 MG/L (ref 5.71–26.3)
LAMBDA LC FREE SERPL-MCNC: 23.38 MG/L (ref 5.71–26.3)
LDH SERPL L TO P-CCNC: 271 U/L (ref 107–266)
LDLC SERPL CALC-MCNC: 89 MG/DL
LV EJECT FRACT  99904: 65
LYMPHOCYTES # BLD AUTO: 1.19 K/UL (ref 1–4.8)
LYMPHOCYTES # BLD AUTO: 1.27 K/UL (ref 1–4.8)
LYMPHOCYTES # BLD AUTO: 1.41 K/UL (ref 1–4.8)
LYMPHOCYTES # BLD AUTO: 1.47 K/UL (ref 1–4.8)
LYMPHOCYTES NFR BLD: 22.5 % (ref 22–41)
LYMPHOCYTES NFR BLD: 23.6 % (ref 22–41)
LYMPHOCYTES NFR BLD: 23.9 % (ref 22–41)
LYMPHOCYTES NFR BLD: 24 % (ref 22–41)
MCH RBC QN AUTO: 33 PG (ref 27–33)
MCH RBC QN AUTO: 33.2 PG (ref 27–33)
MCH RBC QN AUTO: 33.3 PG (ref 27–33)
MCH RBC QN AUTO: 33.5 PG (ref 27–33)
MCHC RBC AUTO-ENTMCNC: 32.2 G/DL (ref 33.7–35.3)
MCHC RBC AUTO-ENTMCNC: 32.6 G/DL (ref 33.7–35.3)
MCHC RBC AUTO-ENTMCNC: 33.3 G/DL (ref 33.7–35.3)
MCHC RBC AUTO-ENTMCNC: 33.7 G/DL (ref 33.7–35.3)
MCV RBC AUTO: 101.1 FL (ref 81.4–97.8)
MCV RBC AUTO: 103.9 FL (ref 81.4–97.8)
MCV RBC AUTO: 98.7 FL (ref 81.4–97.8)
MCV RBC AUTO: 99.6 FL (ref 81.4–97.8)
METHYLMALONATE SERPL-SCNC: 0.16 UMOL/L (ref 0–0.4)
MITOCHONDRIA M2 IGG SER-ACNC: 1.5 UNITS (ref 0–24.9)
MONOCYTES # BLD AUTO: 0.53 K/UL (ref 0–0.85)
MONOCYTES # BLD AUTO: 0.58 K/UL (ref 0–0.85)
MONOCYTES # BLD AUTO: 0.64 K/UL (ref 0–0.85)
MONOCYTES # BLD AUTO: 0.69 K/UL (ref 0–0.85)
MONOCYTES NFR BLD AUTO: 10 % (ref 0–13.4)
MONOCYTES NFR BLD AUTO: 10.9 % (ref 0–13.4)
MONOCYTES NFR BLD AUTO: 10.9 % (ref 0–13.4)
MONOCYTES NFR BLD AUTO: 11.1 % (ref 0–13.4)
NEUTROPHILS # BLD AUTO: 3.09 K/UL (ref 1.82–7.42)
NEUTROPHILS # BLD AUTO: 3.34 K/UL (ref 1.82–7.42)
NEUTROPHILS # BLD AUTO: 3.58 K/UL (ref 1.82–7.42)
NEUTROPHILS # BLD AUTO: 3.82 K/UL (ref 1.82–7.42)
NEUTROPHILS NFR BLD: 58.1 % (ref 44–72)
NEUTROPHILS NFR BLD: 60.9 % (ref 44–72)
NEUTROPHILS NFR BLD: 61.2 % (ref 44–72)
NEUTROPHILS NFR BLD: 63 % (ref 44–72)
NRBC # BLD AUTO: 0 K/UL
NRBC BLD-RTO: 0 /100 WBC
NUCLEAR IGG SER QL IA: NORMAL
PLATELET # BLD AUTO: 124 K/UL (ref 164–446)
PLATELET # BLD AUTO: 130 K/UL (ref 164–446)
PLATELET # BLD AUTO: 131 K/UL (ref 164–446)
PLATELET # BLD AUTO: 134 K/UL (ref 164–446)
PMV BLD AUTO: 12.4 FL (ref 9–12.9)
PMV BLD AUTO: 12.9 FL (ref 9–12.9)
PMV BLD AUTO: 13 FL (ref 9–12.9)
PMV BLD AUTO: 13.3 FL (ref 9–12.9)
POTASSIUM SERPL-SCNC: 4.2 MMOL/L (ref 3.6–5.5)
POTASSIUM SERPL-SCNC: 4.7 MMOL/L (ref 3.6–5.5)
PROT SERPL-MCNC: 6.9 G/DL (ref 6.3–8.2)
PROT SERPL-MCNC: 7 G/DL (ref 6.3–8.2)
PROT SERPL-MCNC: 7.2 G/DL (ref 6–8.2)
PROT SERPL-MCNC: 7.3 G/DL (ref 6–8.2)
PROT SERPL-MCNC: 7.3 G/DL (ref 6–8.2)
PSA SERPL-MCNC: 1.51 NG/ML (ref 0–4)
RBC # BLD AUTO: 4.43 M/UL (ref 4.7–6.1)
RBC # BLD AUTO: 4.49 M/UL (ref 4.7–6.1)
RBC # BLD AUTO: 4.57 M/UL (ref 4.7–6.1)
RBC # BLD AUTO: 4.63 M/UL (ref 4.7–6.1)
RETICS # AUTO: 0.07 M/UL (ref 0.04–0.06)
RETICS/RBC NFR: 1.6 % (ref 0.8–2.1)
RHEUMATOID FACT SER IA-ACNC: <10 IU/ML (ref 0–14)
SMA IGG SER-ACNC: 33 UNITS (ref 0–19)
SMOOTH MUSCLE IGG TITR SER: ABNORMAL {TITER}
SODIUM SERPL-SCNC: 137 MMOL/L (ref 135–145)
SODIUM SERPL-SCNC: 138 MMOL/L (ref 135–145)
TIBC SERPL-MCNC: 326 UG/DL (ref 250–450)
TRIGL SERPL-MCNC: 104 MG/DL (ref 0–149)
TSH SERPL DL<=0.005 MIU/L-ACNC: 3.8 UIU/ML (ref 0.38–5.33)
TTG IGA SER IA-ACNC: 2 U/ML (ref 0–3)
UIBC SERPL-MCNC: 184 UG/DL (ref 110–370)
VIT B12 SERPL-MCNC: 911 PG/ML (ref 211–911)
WBC # BLD AUTO: 5.3 K/UL (ref 4.8–10.8)
WBC # BLD AUTO: 5.3 K/UL (ref 4.8–10.8)
WBC # BLD AUTO: 5.9 K/UL (ref 4.8–10.8)
WBC # BLD AUTO: 6.2 K/UL (ref 4.8–10.8)

## 2021-01-01 PROCEDURE — 99213 OFFICE O/P EST LOW 20 MIN: CPT | Performed by: NURSE PRACTITIONER

## 2021-01-01 PROCEDURE — 84080 ASSAY ALKALINE PHOSPHATASES: CPT

## 2021-01-01 PROCEDURE — 83921 ORGANIC ACID SINGLE QUANT: CPT

## 2021-01-01 PROCEDURE — 84155 ASSAY OF PROTEIN SERUM: CPT

## 2021-01-01 PROCEDURE — 93018 CV STRESS TEST I&R ONLY: CPT | Performed by: INTERNAL MEDICINE

## 2021-01-01 PROCEDURE — 84153 ASSAY OF PSA TOTAL: CPT | Mod: GA

## 2021-01-01 PROCEDURE — 0002A PFIZER SARS-COV-2 VACCINE: CPT | Performed by: NURSE PRACTITIONER

## 2021-01-01 PROCEDURE — 82784 ASSAY IGA/IGD/IGG/IGM EACH: CPT

## 2021-01-01 PROCEDURE — 86705 HEP B CORE ANTIBODY IGM: CPT

## 2021-01-01 PROCEDURE — 99214 OFFICE O/P EST MOD 30 MIN: CPT | Performed by: INTERNAL MEDICINE

## 2021-01-01 PROCEDURE — 83520 IMMUNOASSAY QUANT NOS NONAB: CPT | Mod: 91

## 2021-01-01 PROCEDURE — 90471 IMMUNIZATION ADMIN: CPT | Performed by: INTERNAL MEDICINE

## 2021-01-01 PROCEDURE — 83516 IMMUNOASSAY NONANTIBODY: CPT

## 2021-01-01 PROCEDURE — 85025 COMPLETE CBC W/AUTO DIFF WBC: CPT

## 2021-01-01 PROCEDURE — 80076 HEPATIC FUNCTION PANEL: CPT

## 2021-01-01 PROCEDURE — 86707 HEPATITIS BE ANTIBODY: CPT

## 2021-01-01 PROCEDURE — 83010 ASSAY OF HAPTOGLOBIN QUANT: CPT

## 2021-01-01 PROCEDURE — 86038 ANTINUCLEAR ANTIBODIES: CPT

## 2021-01-01 PROCEDURE — 99204 OFFICE O/P NEW MOD 45 MIN: CPT | Performed by: INTERNAL MEDICINE

## 2021-01-01 PROCEDURE — 36415 COLL VENOUS BLD VENIPUNCTURE: CPT

## 2021-01-01 PROCEDURE — 85652 RBC SED RATE AUTOMATED: CPT

## 2021-01-01 PROCEDURE — 85046 RETICYTE/HGB CONCENTRATE: CPT

## 2021-01-01 PROCEDURE — 99214 OFFICE O/P EST MOD 30 MIN: CPT | Mod: 25 | Performed by: INTERNAL MEDICINE

## 2021-01-01 PROCEDURE — 99213 OFFICE O/P EST LOW 20 MIN: CPT | Performed by: INTERNAL MEDICINE

## 2021-01-01 PROCEDURE — 86706 HEP B SURFACE ANTIBODY: CPT | Mod: GA

## 2021-01-01 PROCEDURE — 86334 IMMUNOFIX E-PHORESIS SERUM: CPT

## 2021-01-01 PROCEDURE — 87340 HEPATITIS B SURFACE AG IA: CPT | Mod: GA

## 2021-01-01 PROCEDURE — 76705 ECHO EXAM OF ABDOMEN: CPT

## 2021-01-01 PROCEDURE — 93017 CV STRESS TEST TRACING ONLY: CPT

## 2021-01-01 PROCEDURE — 71100 X-RAY EXAM RIBS UNI 2 VIEWS: CPT | Mod: TC,LT | Performed by: NURSE PRACTITIONER

## 2021-01-01 PROCEDURE — 86200 CCP ANTIBODY: CPT

## 2021-01-01 PROCEDURE — 86803 HEPATITIS C AB TEST: CPT

## 2021-01-01 PROCEDURE — 83615 LACTATE (LD) (LDH) ENZYME: CPT

## 2021-01-01 PROCEDURE — 82977 ASSAY OF GGT: CPT

## 2021-01-01 PROCEDURE — 84443 ASSAY THYROID STIM HORMONE: CPT

## 2021-01-01 PROCEDURE — 83036 HEMOGLOBIN GLYCOSYLATED A1C: CPT | Mod: GA

## 2021-01-01 PROCEDURE — 82977 ASSAY OF GGT: CPT | Mod: GA

## 2021-01-01 PROCEDURE — 83550 IRON BINDING TEST: CPT | Mod: GA

## 2021-01-01 PROCEDURE — 82746 ASSAY OF FOLIC ACID SERUM: CPT

## 2021-01-01 PROCEDURE — 90715 TDAP VACCINE 7 YRS/> IM: CPT | Performed by: INTERNAL MEDICINE

## 2021-01-01 PROCEDURE — 91300 PFIZER SARS-COV-2 VACCINE: CPT | Performed by: NURSE PRACTITIONER

## 2021-01-01 PROCEDURE — 86431 RHEUMATOID FACTOR QUANT: CPT

## 2021-01-01 PROCEDURE — 80053 COMPREHEN METABOLIC PANEL: CPT

## 2021-01-01 PROCEDURE — 82728 ASSAY OF FERRITIN: CPT | Mod: GA

## 2021-01-01 PROCEDURE — 93350 STRESS TTE ONLY: CPT | Mod: 26 | Performed by: INTERNAL MEDICINE

## 2021-01-01 PROCEDURE — 86140 C-REACTIVE PROTEIN: CPT

## 2021-01-01 PROCEDURE — 86256 FLUORESCENT ANTIBODY TITER: CPT

## 2021-01-01 PROCEDURE — 82607 VITAMIN B-12: CPT

## 2021-01-01 PROCEDURE — 84075 ASSAY ALKALINE PHOSPHATASE: CPT

## 2021-01-01 PROCEDURE — 83540 ASSAY OF IRON: CPT | Mod: GA

## 2021-01-01 PROCEDURE — 80061 LIPID PANEL: CPT

## 2021-01-01 PROCEDURE — 84165 PROTEIN E-PHORESIS SERUM: CPT

## 2021-01-01 PROCEDURE — 87350 HEPATITIS BE AG IA: CPT

## 2021-01-01 RX ORDER — ATORVASTATIN CALCIUM 80 MG/1
80 TABLET, FILM COATED ORAL
Qty: 90 TABLET | Refills: 3 | Status: SHIPPED | OUTPATIENT
Start: 2021-01-01 | End: 2022-01-01

## 2021-01-01 RX ORDER — DOXYCYCLINE HYCLATE 100 MG
100 TABLET ORAL 2 TIMES DAILY
Qty: 14 TABLET | Refills: 0 | Status: SHIPPED | OUTPATIENT
Start: 2021-01-01 | End: 2021-01-01

## 2021-01-01 RX ORDER — LISINOPRIL 5 MG/1
5 TABLET ORAL DAILY
Qty: 90 TABLET | Refills: 3 | Status: SHIPPED | OUTPATIENT
Start: 2021-01-01 | End: 2022-01-01

## 2021-01-01 RX ORDER — MULTIVIT-MIN/IRON/FOLIC ACID/K 18-600-40
1 CAPSULE ORAL DAILY
COMMUNITY

## 2021-01-01 RX ORDER — CELECOXIB 100 MG/1
1 CAPSULE ORAL
COMMUNITY
Start: 2021-01-01

## 2021-01-01 SDOH — ECONOMIC STABILITY: INCOME INSECURITY: IN THE LAST 12 MONTHS, WAS THERE A TIME WHEN YOU WERE NOT ABLE TO PAY THE MORTGAGE OR RENT ON TIME?: NO

## 2021-01-01 SDOH — ECONOMIC STABILITY: HOUSING INSECURITY: IN THE LAST 12 MONTHS, HOW MANY PLACES HAVE YOU LIVED?: 1

## 2021-01-01 SDOH — HEALTH STABILITY: MENTAL HEALTH
STRESS IS WHEN SOMEONE FEELS TENSE, NERVOUS, ANXIOUS, OR CAN'T SLEEP AT NIGHT BECAUSE THEIR MIND IS TROUBLED. HOW STRESSED ARE YOU?: NOT AT ALL

## 2021-01-01 SDOH — ECONOMIC STABILITY: HOUSING INSECURITY
IN THE LAST 12 MONTHS, WAS THERE A TIME WHEN YOU DID NOT HAVE A STEADY PLACE TO SLEEP OR SLEPT IN A SHELTER (INCLUDING NOW)?: NO

## 2021-01-01 SDOH — HEALTH STABILITY: PHYSICAL HEALTH: ON AVERAGE, HOW MANY DAYS PER WEEK DO YOU ENGAGE IN MODERATE TO STRENUOUS EXERCISE (LIKE A BRISK WALK)?: 5 DAYS

## 2021-01-01 SDOH — ECONOMIC STABILITY: TRANSPORTATION INSECURITY
IN THE PAST 12 MONTHS, HAS LACK OF RELIABLE TRANSPORTATION KEPT YOU FROM MEDICAL APPOINTMENTS, MEETINGS, WORK OR FROM GETTING THINGS NEEDED FOR DAILY LIVING?: NO

## 2021-01-01 SDOH — ECONOMIC STABILITY: TRANSPORTATION INSECURITY
IN THE PAST 12 MONTHS, HAS THE LACK OF TRANSPORTATION KEPT YOU FROM MEDICAL APPOINTMENTS OR FROM GETTING MEDICATIONS?: NO

## 2021-01-01 SDOH — HEALTH STABILITY: PHYSICAL HEALTH: ON AVERAGE, HOW MANY MINUTES DO YOU ENGAGE IN EXERCISE AT THIS LEVEL?: 10 MINUTES

## 2021-01-01 ASSESSMENT — FIBROSIS 4 INDEX
FIB4 SCORE: 6.87
FIB4 SCORE: 2.83
FIB4 SCORE: 6.87
FIB4 SCORE: 6.68
FIB4 SCORE: 6.68
FIB4 SCORE: 2.83
FIB4 SCORE: 6.87
FIB4 SCORE: 6.87
FIB4 SCORE: 7.26
FIB4 SCORE: 7.26
FIB4 SCORE: 2.83

## 2021-01-01 ASSESSMENT — SOCIAL DETERMINANTS OF HEALTH (SDOH)
IN A TYPICAL WEEK, HOW MANY TIMES DO YOU TALK ON THE PHONE WITH FAMILY, FRIENDS, OR NEIGHBORS?: MORE THAN THREE TIMES A WEEK
HOW MANY DRINKS CONTAINING ALCOHOL DO YOU HAVE ON A TYPICAL DAY WHEN YOU ARE DRINKING: 3 OR 4
DO YOU BELONG TO ANY CLUBS OR ORGANIZATIONS SUCH AS CHURCH GROUPS UNIONS, FRATERNAL OR ATHLETIC GROUPS, OR SCHOOL GROUPS?: NO
HOW HARD IS IT FOR YOU TO PAY FOR THE VERY BASICS LIKE FOOD, HOUSING, MEDICAL CARE, AND HEATING?: NOT HARD AT ALL
WITHIN THE PAST 12 MONTHS, YOU WORRIED THAT YOUR FOOD WOULD RUN OUT BEFORE YOU GOT THE MONEY TO BUY MORE: NEVER TRUE
HOW OFTEN DO YOU HAVE SIX OR MORE DRINKS ON ONE OCCASION: NEVER
HOW OFTEN DO YOU HAVE A DRINK CONTAINING ALCOHOL: 4 OR MORE TIMES A WEEK
HOW OFTEN DO YOU ATTEND CHURCH OR RELIGIOUS SERVICES?: NEVER
WITHIN THE PAST 12 MONTHS, THE FOOD YOU BOUGHT JUST DIDN'T LAST AND YOU DIDN'T HAVE MONEY TO GET MORE: NEVER TRUE
HOW OFTEN DO YOU GET TOGETHER WITH FRIENDS OR RELATIVES?: NEVER
HOW OFTEN DO YOU ATTENT MEETINGS OF THE CLUB OR ORGANIZATION YOU BELONG TO?: NEVER

## 2021-01-01 ASSESSMENT — ENCOUNTER SYMPTOMS
VOMITING: 0
PALPITATIONS: 0
ORTHOPNEA: 0
SENSORY CHANGE: 0
WHEEZING: 0
NAUSEA: 0
CHILLS: 0
BURN: 1
FEVER: 0
CONSTITUTIONAL NEGATIVE: 1
SHORTNESS OF BREATH: 0
MYALGIAS: 1
TINGLING: 0
COUGH: 0
FALLS: 1
ABDOMINAL PAIN: 0
BRUISES/BLEEDS EASILY: 0
WEAKNESS: 0

## 2021-01-01 ASSESSMENT — PAIN SCALES - GENERAL
PAINLEVEL: NO PAIN
PAINLEVEL: NO PAIN

## 2021-01-01 ASSESSMENT — VISUAL ACUITY: OU: 1

## 2021-01-01 ASSESSMENT — PATIENT HEALTH QUESTIONNAIRE - PHQ9: CLINICAL INTERPRETATION OF PHQ2 SCORE: 0

## 2021-01-01 NOTE — PATIENT INSTRUCTIONS
Viral Illness, Adult  Viruses are tiny germs that can get into a person's body and cause illness. There are many different types of viruses, and they cause many types of illness. Viral illnesses can range from mild to severe. They can affect various parts of the body.  Common illnesses that are caused by a virus include colds and the flu. Viral illnesses also include serious conditions such as HIV/AIDS (human immunodeficiency virus/acquired immunodeficiency syndrome). A few viruses have been linked to certain cancers.  What are the causes?  Many types of viruses can cause illness. Viruses invade cells in your body, multiply, and cause the infected cells to malfunction or die. When the cell dies, it releases more of the virus. When this happens, you develop symptoms of the illness, and the virus continues to spread to other cells. If the virus takes over the function of the cell, it can cause the cell to divide and grow out of control, as is the case when a virus causes cancer.  Different viruses get into the body in different ways. You can get a virus by:  · Swallowing food or water that is contaminated with the virus.  · Breathing in droplets that have been coughed or sneezed into the air by an infected person.  · Touching a surface that has been contaminated with the virus and then touching your eyes, nose, or mouth.  · Being bitten by an insect or animal that carries the virus.  · Having sexual contact with a person who is infected with the virus.  · Being exposed to blood or fluids that contain the virus, either through an open cut or during a transfusion.  If a virus enters your body, your body's defense system (immune system) will try to fight the virus. You may be at higher risk for a viral illness if your immune system is weak.  What are the signs or symptoms?  Symptoms vary depending on the type of virus and the location of the cells that it invades. Common symptoms of the main types of viral illnesses  include:  Cold and flu viruses  · Fever.  · Headache.  · Sore throat.  · Muscle aches.  · Nasal congestion.  · Cough.  Digestive system (gastrointestinal) viruses  · Fever.  · Abdominal pain.  · Nausea.  · Diarrhea.  Liver viruses (hepatitis)  · Loss of appetite.  · Tiredness.  · Yellowing of the skin (jaundice).  Brain and spinal cord viruses  · Fever.  · Headache.  · Stiff neck.  · Nausea and vomiting.  · Confusion or sleepiness.  Skin viruses  · Warts.  · Itching.  · Rash.  Sexually transmitted viruses  · Discharge.  · Swelling.  · Redness.  · Rash.  How is this treated?  Viruses can be difficult to treat because they live within cells. Antibiotic medicines do not treat viruses because these drugs do not get inside cells. Treatment for a viral illness may include:  · Resting and drinking plenty of fluids.  · Medicines to relieve symptoms. These can include over-the-counter medicine for pain and fever, medicines for cough or congestion, and medicines to relieve diarrhea.  · Antiviral medicines. These drugs are available only for certain types of viruses. They may help reduce flu symptoms if taken early. There are also many antiviral medicines for hepatitis and HIV/AIDS.  Some viral illnesses can be prevented with vaccinations. A common example is the flu shot.  Follow these instructions at home:  Medicines    · Take over-the-counter and prescription medicines only as told by your health care provider.  · If you were prescribed an antiviral medicine, take it as told by your health care provider. Do not stop taking the medicine even if you start to feel better.  · Be aware of when antibiotics are needed and when they are not needed. Antibiotics do not treat viruses. If your health care provider thinks that you may have a bacterial infection as well as a viral infection, you may get an antibiotic.  ? Do not ask for an antibiotic prescription if you have been diagnosed with a viral illness. That will not make your  illness go away faster.  ? Frequently taking antibiotics when they are not needed can lead to antibiotic resistance. When this develops, the medicine no longer works against the bacteria that it normally fights.  General instructions  · Drink enough fluids to keep your urine clear or pale yellow.  · Rest as much as possible.  · Return to your normal activities as told by your health care provider. Ask your health care provider what activities are safe for you.  · Keep all follow-up visits as told by your health care provider. This is important.  How is this prevented?  Take these actions to reduce your risk of viral infection:  · Eat a healthy diet and get enough rest.  · Wash your hands often with soap and water. This is especially important when you are in public places. If soap and water are not available, use hand .  · Avoid close contact with friends and family who have a viral illness.  · If you travel to areas where viral gastrointestinal infection is common, avoid drinking water or eating raw food.  · Keep your immunizations up to date. Get a flu shot every year as told by your health care provider.  · Do not share toothbrushes, nail clippers, razors, or needles with other people.  · Always practice safe sex.    Contact a health care provider if:  · You have symptoms of a viral illness that do not go away.  · Your symptoms come back after going away.  · Your symptoms get worse.  Get help right away if:  · You have trouble breathing.  · You have a severe headache or a stiff neck.  · You have severe vomiting or abdominal pain.  This information is not intended to replace advice given to you by your health care provider. Make sure you discuss any questions you have with your health care provider.  Document Released: 04/28/2017 Document Revised: 11/30/2018 Document Reviewed: 04/28/2017  Elsevier Patient Education © 2020 [employer]     Vaginal Delivery

## 2021-01-11 DIAGNOSIS — Z23 NEED FOR VACCINATION: ICD-10-CM

## 2021-02-18 ENCOUNTER — TELEPHONE (OUTPATIENT)
Dept: CARDIOLOGY | Facility: MEDICAL CENTER | Age: 81
End: 2021-02-18

## 2021-02-18 ENCOUNTER — OFFICE VISIT (OUTPATIENT)
Dept: CARDIOLOGY | Facility: MEDICAL CENTER | Age: 81
End: 2021-02-18
Payer: MEDICARE

## 2021-02-18 VITALS
DIASTOLIC BLOOD PRESSURE: 80 MMHG | BODY MASS INDEX: 37.33 KG/M2 | HEIGHT: 69 IN | HEART RATE: 80 BPM | OXYGEN SATURATION: 96 % | RESPIRATION RATE: 14 BRPM | WEIGHT: 252 LBS | SYSTOLIC BLOOD PRESSURE: 126 MMHG

## 2021-02-18 DIAGNOSIS — G47.33 OSA (OBSTRUCTIVE SLEEP APNEA): ICD-10-CM

## 2021-02-18 DIAGNOSIS — I10 ESSENTIAL HYPERTENSION: ICD-10-CM

## 2021-02-18 DIAGNOSIS — E78.49 OTHER HYPERLIPIDEMIA: ICD-10-CM

## 2021-02-18 DIAGNOSIS — E66.01 CLASS 2 SEVERE OBESITY DUE TO EXCESS CALORIES WITH SERIOUS COMORBIDITY AND BODY MASS INDEX (BMI) OF 37.0 TO 37.9 IN ADULT (HCC): ICD-10-CM

## 2021-02-18 DIAGNOSIS — I25.10 CORONARY ARTERY DISEASE INVOLVING NATIVE CORONARY ARTERY OF NATIVE HEART WITHOUT ANGINA PECTORIS: ICD-10-CM

## 2021-02-18 PROCEDURE — 99214 OFFICE O/P EST MOD 30 MIN: CPT | Performed by: INTERNAL MEDICINE

## 2021-02-18 RX ORDER — ICOSAPENT ETHYL 1000 MG/1
2 CAPSULE ORAL
Qty: 360 CAPSULE | Refills: 3 | Status: SHIPPED | OUTPATIENT
Start: 2021-02-18 | End: 2021-02-18

## 2021-02-18 RX ORDER — EZETIMIBE 10 MG/1
10 TABLET ORAL DAILY
Qty: 90 TABLET | Refills: 3 | Status: SHIPPED | OUTPATIENT
Start: 2021-02-18 | End: 2021-01-01

## 2021-02-18 ASSESSMENT — FIBROSIS 4 INDEX: FIB4 SCORE: 2.83

## 2021-02-18 NOTE — TELEPHONE ENCOUNTER
BE    Patient wife, Maggie, called in to advise Pt RX for ezetimibe (ZETIA) 10 MG Tab is 130.00 and the Icosapent Ethyl 1 g Cap the pharmacy did not have it in stock so they are not sure what thee price is at this time. Please call them back at [phone].    Thank you,  Rosette PAINTER

## 2021-02-18 NOTE — TELEPHONE ENCOUNTER
You  Douglas Whittaker M.D. 15 minutes ago (3:09 PM)     FYI, Vascepa will be over $500 after insurance, which pt won't be able to afford.      Douglas Whittaker M.D.  You 8 minutes ago (3:16 PM)     Skip Vascepa. Focus on diet instead- it will be cheaper      LVM with pt at [phone] notifying that he can focus on improving diet instead of Vascepa. Med list updated.

## 2021-02-18 NOTE — TELEPHONE ENCOUNTER
Called [facility] pharmacy at [phone]. They explained that Jaime did go through his insurance, but it appears pt needs to meet a deductible. Their system doesn't show what cost will be after deductible is met. They don't have Vascepa in stock, but it looks like even after insurance it will be over $500.     Returned Maggie's call and informed of above. She plans to call the insurance company to see what the Zetia cost would be after they meet the deductible. Also encouraged her to look into [facility] as another option. Informed that the pharmacy priced the Vascepa at over $500 after insurance, and she said this won't be an option financially.

## 2021-02-18 NOTE — PROGRESS NOTES
CARDIOLOGY OUTPATIENT FOLLOWUP    PCP: Afua Solorio M.D.    1. Coronary artery disease involving native coronary artery of native heart without angina pectoris    2. Essential hypertension    3. Other hyperlipidemia    4. ADRIANE (obstructive sleep apnea)    5. Class 2 severe obesity due to excess calories with serious comorbidity and body mass index (BMI) of 37.0 to 37.9 in adult (HCC)        Kyle Fleming has new exertional class II-III shortness of breath which may be an anginal equivalent but I suspect it is more probably related to weight gain and deconditioning due to the pandemic.  I arranged a stress echocardiogram and counseled him about healthy lifestyle practices.  Along with the weight gain he has also seen marked increase in cholesterol which was previously well controlled.  I recommended lifestyle factors but also added Zetia and icosapent ethyl to atorvastatin.  He will repeat a lipid profile and 2 months.  I encouraged him to pursue adherence to positive pressure therapy for the recent diagnosis of sleep apnea.    Follow up: in 3 months    Chief Complaint   Patient presents with   • Coronary Artery Disease       History: Kyle Fleming is a 81 y.o. male with a past medical history of coronary artery disease, hypertension and hyperlipidemia.  Since her last evaluation he reports new exertional shortness of breath which he notices when walking up an incline near his home.  He does not experience chest discomfort.  Symptoms are not similar to before PCI in 2018, at which time he was experiencing frequent near syncope.  He reports a substantial weight gain over the past several months, though I note that he is only 1 kg heavier than the last time he was in my office.  Similarly, he has become more sedentary during the pandemic.  Recent lipid profile demonstrated doubling of LDL cholesterol from this time last year as well as marked elevation in triglycerides.  Fortunately, blood pressure has been  "under good control.  He was recently diagnosed with sleep apnea and is seeing sleep medicine tomorrow.      ROS:   All other systems reviewed and negative except as per the HPI    PE:  /80 (BP Location: Right arm, Patient Position: Sitting, BP Cuff Size: Large adult)   Pulse 80   Resp 14   Ht 1.753 m (5' 9\")   Wt 114 kg (252 lb)   SpO2 96%   BMI 37.21 kg/m²   Gen: Well appearing  HEENT: Symmetric face. Anicteric sclerae. Moist mucus membranes  NECK: No JVD. No lymphadenopathy  CARDIAC: Normal PMI, regular, normal S1, S2. without murmur  VASCULATURE: Normal carotid amplitude without bruit.   RESP: Clear to auscultation bilaterally  ABD: Soft, non-tender, non-distended  EXT: No edema, no clubbing or cyanosis  SKIN: Warm and dry  NEURO: No gross deficits  PSYCH: Appropriate affect, participates in conversation    Past Medical History:   Diagnosis Date   • Hyperlipidemia    • Hypertension      Allergies   Allergen Reactions   • Ciprofloxacin Hcl Rash     rash     Outpatient Encounter Medications as of 2/18/2021   Medication Sig Dispense Refill   • Icosapent Ethyl 1 g Cap Take 2 Tablets by mouth 2 (two) times a day. 360 capsule 3   • ezetimibe (ZETIA) 10 MG Tab Take 1 tablet by mouth every day. 90 tablet 3   • lisinopril (PRINIVIL) 5 MG Tab TAKE 1 TABLET BY MOUTH  DAILY 90 Tab 3   • atorvastatin (LIPITOR) 80 MG tablet TAKE 1 TABLET BY MOUTH  EVERY DAY 90 Tab 3   • Magnesium 250 MG Tab Take  by mouth every day.     • ascorbic acid (ASCORBIC ACID) 500 MG Tab Take 500 mg by mouth every day.     • Fexofenadine HCl (ALLERGY 24-HR PO) Take  by mouth every day.     • Multiple Vitamins-Minerals (OCUVITE-LUTEIN) Tab Take 1 tablet by mouth every day.     • Cholecalciferol (VITAMIN D3) 3000 units Tab Take  by mouth.     • aspirin 81 MG EC tablet Take 1 Tab by mouth every day. 90 Tab 3   • [DISCONTINUED] FLUAD QUADRIVALENT 0.5 ML Prefilled Syringe PHARMACY ADMINISTERED     • [DISCONTINUED] ibuprofen (MOTRIN) 800 MG Tab " Take 1 Tab by mouth every 8 hours as needed for Moderate Pain or Inflammation. 90 Tab 1     No facility-administered encounter medications on file as of 2021.     Social History     Socioeconomic History   • Marital status:      Spouse name: Not on file   • Number of children: Not on file   • Years of education: Not on file   • Highest education level: Not on file   Occupational History   • Not on file   Tobacco Use   • Smoking status: Former Smoker     Packs/day: 0.25     Years: 20.00     Pack years: 5.00     Types: Cigarettes     Quit date:      Years since quittin.1   • Smokeless tobacco: Never Used   Substance and Sexual Activity   • Alcohol use: Not Currently     Comment: wine   • Drug use: Never   • Sexual activity: Not Currently     Comment: , retired    Other Topics Concern   • Not on file   Social History Narrative   • Not on file     Social Determinants of Health     Financial Resource Strain:    • Difficulty of Paying Living Expenses:    Food Insecurity:    • Worried About Running Out of Food in the Last Year:    • Ran Out of Food in the Last Year:    Transportation Needs:    • Lack of Transportation (Medical):    • Lack of Transportation (Non-Medical):    Physical Activity:    • Days of Exercise per Week:    • Minutes of Exercise per Session:    Stress:    • Feeling of Stress :    Social Connections:    • Frequency of Communication with Friends and Family:    • Frequency of Social Gatherings with Friends and Family:    • Attends [employer] Services:    • Active Member of Clubs or Organizations:    • Attends Club or Organization Meetings:    • Marital Status:    Intimate Partner Violence:    • Fear of Current or Ex-Partner:    • Emotionally Abused:    • Physically Abused:    • Sexually Abused:        Studies  Lab Results   Component Value Date/Time    CHOLSTRLTOT 263 (H) 2020 07:09 AM     (H) 2020 07:09 AM    HDL 50 2020 07:09 AM     TRIGLYCERIDE 309 (H) 12/17/2020 07:09 AM       Lab Results   Component Value Date/Time    SODIUM 143 12/17/2020 07:09 AM    POTASSIUM 4.4 12/17/2020 07:09 AM    CHLORIDE 110 12/17/2020 07:09 AM    CO2 23 12/17/2020 07:09 AM    GLUCOSE 119 (H) 12/17/2020 07:09 AM    BUN 18 12/17/2020 07:09 AM    CREATININE 0.73 12/17/2020 07:09 AM     Lab Results   Component Value Date/Time    ALKPHOSPHAT 62 12/17/2020 07:09 AM    ASTSGOT 29 12/17/2020 07:09 AM    ALTSGPT 28 12/17/2020 07:09 AM    TBILIRUBIN 0.7 12/17/2020 07:09 AM        For this encounter I reviewed the following medical records BMP, Lipid profile and CBC   For this encounter I directly reviewed ECG tracings. I agree with the interpretations in the electronic health record.    Adjunctive assessment was made after consulting with the patient's spouse

## 2021-02-19 ENCOUNTER — HOSPITAL ENCOUNTER (OUTPATIENT)
Dept: LAB | Facility: MEDICAL CENTER | Age: 81
End: 2021-02-19
Attending: INTERNAL MEDICINE
Payer: MEDICARE

## 2021-02-19 DIAGNOSIS — I25.10 CORONARY ARTERY DISEASE INVOLVING NATIVE CORONARY ARTERY OF NATIVE HEART WITHOUT ANGINA PECTORIS: ICD-10-CM

## 2021-02-19 LAB
CHOLEST SERPL-MCNC: 159 MG/DL (ref 100–199)
HDLC SERPL-MCNC: 51 MG/DL
LDLC SERPL CALC-MCNC: 76 MG/DL
TRIGL SERPL-MCNC: 158 MG/DL (ref 0–149)

## 2021-02-19 PROCEDURE — 80061 LIPID PANEL: CPT

## 2021-02-19 PROCEDURE — 36415 COLL VENOUS BLD VENIPUNCTURE: CPT

## 2021-02-23 ENCOUNTER — IMMUNIZATION (OUTPATIENT)
Dept: FAMILY PLANNING/WOMEN'S HEALTH CLINIC | Facility: IMMUNIZATION CENTER | Age: 81
End: 2021-02-23
Payer: MEDICARE

## 2021-02-23 ENCOUNTER — APPOINTMENT (OUTPATIENT)
Dept: FAMILY PLANNING/WOMEN'S HEALTH CLINIC | Facility: IMMUNIZATION CENTER | Age: 81
End: 2021-02-23
Attending: INTERNAL MEDICINE
Payer: MEDICARE

## 2021-02-23 DIAGNOSIS — Z23 ENCOUNTER FOR VACCINATION: Primary | ICD-10-CM

## 2021-02-23 DIAGNOSIS — Z23 NEED FOR VACCINATION: ICD-10-CM

## 2021-02-23 PROCEDURE — 91300 PFIZER SARS-COV-2 VACCINE: CPT

## 2021-02-23 PROCEDURE — 0001A PFIZER SARS-COV-2 VACCINE: CPT

## 2021-03-09 PROBLEM — L98.9 SKIN LESION OF FACE: Status: ACTIVE | Noted: 2021-01-01

## 2021-03-09 PROBLEM — G47.33 OBSTRUCTIVE SLEEP APNEA SYNDROME: Status: ACTIVE | Noted: 2021-01-01

## 2021-03-09 PROBLEM — Z91.89 AT RISK FOR OBSTRUCTIVE SLEEP APNEA: Status: RESOLVED | Noted: 2019-11-08 | Resolved: 2021-01-01

## 2021-03-09 PROBLEM — R09.89 UPPER RESPIRATORY SYMPTOM: Status: RESOLVED | Noted: 2020-10-15 | Resolved: 2021-01-01

## 2021-03-09 NOTE — ASSESSMENT & PLAN NOTE
CAD s/p JOSSELINE (Everolimus eluting) in March 2018. On Aspirin, statin, zetia, lisinopril. Cardiology discontinued metoprolol in Nov 2019.  Pending stress echo  Following with [facility]

## 2021-03-10 NOTE — PROGRESS NOTES
Established Patient    Kyle Fleming is a 81 y.o. male who presents today with the following:    CC:   Chief Complaint   Patient presents with   • Establish Care   • Follow-Up       HPI:     Coronary artery disease involving native coronary artery of native heart without angina pectoris  CAD s/p JOSSELINE (Everolimus eluting) in March 2018. On Aspirin, statin, zetia, lisinopril. Cardiology discontinued metoprolol in Nov 2019.  Pending stress echo  Following with [facility] cardiology      Class 2 obesity  Body mass index is 37.13 kg/m².  Lifestyle modification      Obstructive sleep apnea syndrome  Going to be on CPAP  Following with [facility]        Essential hypertension  Stable on lisinopril      Other hyperlipidemia  The 10-year ASCVD risk score (Pedro DELA CRUZ Jr., et al., 2013) is: 34.6%    Values used to calculate the score:      Age: 81 years      Sex: Male      Is Non- : No      Diabetic: No      Tobacco smoker: No      Systolic Blood Pressure: 130 mmHg      Is BP treated: Yes      HDL Cholesterol: 51 mg/dL      Total Cholesterol: 159 mg/dL  Controlled on atorvastatin 80 mg daily  Lifestyle modification      Skin lesion of face  Sun damaged skin on face, red patches on face.         Current Outpatient Medications   Medication Sig Dispense Refill   • lisinopril (PRINIVIL) 5 MG Tab Take 1 tablet by mouth every day. TAKE 1 TABLET BY MOUTH DAILY 90 tablet 3   • atorvastatin (LIPITOR) 80 MG tablet Take 1 tablet by mouth every day. 90 tablet 3   • ezetimibe (ZETIA) 10 MG Tab Take 1 tablet by mouth every day. 90 tablet 3   • Magnesium 250 MG Tab Take  by mouth every day.     • ascorbic acid (ASCORBIC ACID) 500 MG Tab Take 500 mg by mouth every day.     • Fexofenadine HCl (ALLERGY 24-HR PO) Take  by mouth every day.     • Multiple Vitamins-Minerals (OCUVITE-LUTEIN) Tab Take 1 tablet by mouth every day.     • Cholecalciferol (VITAMIN D3) 3000 units Tab Take  by mouth.     • aspirin 81 MG EC tablet  "Take 1 Tab by mouth every day. 90 Tab 3     No current facility-administered medications for this visit.       Allergies, past medical history, past surgical history, medications, family history, social history reviewed and updated.    ROS   Constitutional: Denies fevers or chills  Eyes: Denies changes in vision  Ears/Nose/Throat/Mouth: Denies nasal congestion or sore throat   Cardiovascular: Denies chest pain or palpitations   Respiratory: Denies shortness of breath , Denies cough  Gastrointestinal/Hepatic: Denies abd pain, nausea, vomiting   Genitourinary: Denies dysuria or frequency  Musculoskeletal/Rheum: Denies joint pain and swelling   Neurological: Denies headache  Psychiatric: Denies mood disorder   Endocrine: Denies hx of diabetes or thyroid dysfunction  Heme/Oncology/Lymph Nodes: Denies weight changes or enlarged LNs.    Physical Exam  Vitals: /82 (BP Location: Left arm, Patient Position: Sitting, BP Cuff Size: Adult)   Pulse 79   Temp 36.7 °C (98.1 °F) (Temporal)   Ht 1.76 m (5' 9.29\")   Wt 115 kg (253 lb 8.5 oz)   SpO2 96%   BMI 37.13 kg/m²   General: Alert, pleasant, NAD  HEENT: Normocephalic.  EOMI, no icterus or pallor.  Conjunctivae and lids normal. External ears normal. Oropharynx non-erythematous, mucous membranes moist.  Neck supple.  No thyromegaly or masses palpated.   Lymph: No cervical or supraclavicular lymphadenopathy.  Cardiovascular: Regular rate and rhythm.  S1 and S2 normal.  No murmurs appreciated.  Respiratory: Normal respiratory effort.  Clear to auscultation bilaterally.  Abdomen: Non-distended, soft, non-tender  Skin: Warm, dry, red patches on face, some lesions are raised, multiple moles on trunk.  Musculoskeletal: Gait is normal.  Moves all extremities well.  Extremities: No leg edema.    Psych:  Affect/mood is normal, judgement is good, memory is intact, grooming is appropriate.      Assessment and Plan    1. Essential hypertension  - lisinopril (PRINIVIL) 5 MG Tab; " Take 1 tablet by mouth every day. TAKE 1 TABLET BY MOUTH DAILY  Dispense: 90 tablet; Refill: 3  - CBC WITH DIFFERENTIAL; Future  - Comp Metabolic Panel; Future  - TSH WITH REFLEX TO FT4; Future    2. Coronary artery disease involving native coronary artery of native heart without angina pectoris  - atorvastatin (LIPITOR) 80 MG tablet; Take 1 tablet by mouth every day.  Dispense: 90 tablet; Refill: 3  On aspirin, statin, lisinopril  Following with cardiology    3. Obesity (BMI 35.0-39.9 without comorbidity)  Healthful lifestyle measures    4. Obstructive sleep apnea syndrome  Following with sleep medicine  Going to try CPAP soon    5. Other hyperlipidemia  - Lipid Profile; Future  - TSH WITH REFLEX TO FT4; Future  Statin    6. Prostate cancer screening  - PROSTATE SPECIFIC AG SCREENING; Future    7. Skin cancer screening  - REFERRAL TO DERMATOLOGY    8. Skin lesion of face  - REFERRAL TO DERMATOLOGY    9. Impaired fasting glucose  -     HEMOGLOBIN A1C; Future  Healthful lifestyle measures        Follow-up:Return in about 3 months (around 6/9/2021), or if symptoms worsen or fail to improve.    This note was created using voice recognition software. There may be unintended errors in spelling, grammar or content.

## 2021-03-10 NOTE — ASSESSMENT & PLAN NOTE
The 10-year ASCVD risk score (Pedro DELA CRUZ Jr., et al., 2013) is: 34.6%    Values used to calculate the score:      Age: 81 years      Sex: Male      Is Non- : No      Diabetic: No      Tobacco smoker: No      Systolic Blood Pressure: 130 mmHg      Is BP treated: Yes      HDL Cholesterol: 51 mg/dL      Total Cholesterol: 159 mg/dL  Controlled on atorvastatin 80 mg daily  Lifestyle modification

## 2021-04-02 ENCOUNTER — APPOINTMENT (RX ONLY)
Dept: URBAN - METROPOLITAN AREA CLINIC 31 | Facility: CLINIC | Age: 81
Setting detail: DERMATOLOGY
End: 2021-04-02

## 2021-04-02 DIAGNOSIS — L82.1 OTHER SEBORRHEIC KERATOSIS: ICD-10-CM

## 2021-04-02 DIAGNOSIS — D18.0 HEMANGIOMA: ICD-10-CM

## 2021-04-02 DIAGNOSIS — L81.4 OTHER MELANIN HYPERPIGMENTATION: ICD-10-CM

## 2021-04-02 DIAGNOSIS — L57.0 ACTINIC KERATOSIS: ICD-10-CM

## 2021-04-02 PROBLEM — D48.5 NEOPLASM OF UNCERTAIN BEHAVIOR OF SKIN: Status: ACTIVE | Noted: 2021-04-02

## 2021-04-02 PROBLEM — D18.01 HEMANGIOMA OF SKIN AND SUBCUTANEOUS TISSUE: Status: ACTIVE | Noted: 2021-04-02

## 2021-04-02 PROCEDURE — 17000 DESTRUCT PREMALG LESION: CPT

## 2021-04-02 PROCEDURE — ? LIQUID NITROGEN

## 2021-04-02 PROCEDURE — 99203 OFFICE O/P NEW LOW 30 MIN: CPT | Mod: 25

## 2021-04-02 PROCEDURE — ? BIOPSY BY SHAVE METHOD

## 2021-04-02 PROCEDURE — 17003 DESTRUCT PREMALG LES 2-14: CPT

## 2021-04-02 PROCEDURE — 69100 BIOPSY OF EXTERNAL EAR: CPT | Mod: 59

## 2021-04-02 PROCEDURE — ? COUNSELING

## 2021-04-02 ASSESSMENT — LOCATION DETAILED DESCRIPTION DERM
LOCATION DETAILED: RIGHT MEDIAL FOREHEAD
LOCATION DETAILED: LEFT CENTRAL TEMPLE
LOCATION DETAILED: RIGHT SUPERIOR MEDIAL MALAR CHEEK
LOCATION DETAILED: RIGHT INFERIOR MEDIAL UPPER BACK
LOCATION DETAILED: RIGHT MID-UPPER BACK
LOCATION DETAILED: RIGHT SUPERIOR UPPER BACK

## 2021-04-02 ASSESSMENT — LOCATION SIMPLE DESCRIPTION DERM
LOCATION SIMPLE: RIGHT UPPER BACK
LOCATION SIMPLE: RIGHT CHEEK
LOCATION SIMPLE: LEFT TEMPLE
LOCATION SIMPLE: RIGHT FOREHEAD

## 2021-04-02 ASSESSMENT — LOCATION ZONE DERM
LOCATION ZONE: FACE
LOCATION ZONE: TRUNK

## 2021-04-09 NOTE — PROGRESS NOTES
Established Patient    Kyle Fleming is a 81 y.o. male who presents today with the following:    CC:   Chief Complaint   Patient presents with   • Knee Pain     x7 days       HPI:     Acute on chronic right knee pain for 1 week  Denies significant recent trauma    Current Outpatient Medications   Medication Sig Dispense Refill   • lisinopril (PRINIVIL) 5 MG Tab Take 1 tablet by mouth every day. TAKE 1 TABLET BY MOUTH DAILY 90 tablet 3   • atorvastatin (LIPITOR) 80 MG tablet Take 1 tablet by mouth every day. 90 tablet 3   • ezetimibe (ZETIA) 10 MG Tab Take 1 tablet by mouth every day. 90 tablet 3   • Magnesium 250 MG Tab Take  by mouth every day.     • ascorbic acid (ASCORBIC ACID) 500 MG Tab Take 500 mg by mouth every day.     • Fexofenadine HCl (ALLERGY 24-HR PO) Take  by mouth every day.     • Multiple Vitamins-Minerals (OCUVITE-LUTEIN) Tab Take 1 tablet by mouth every day.     • Cholecalciferol (VITAMIN D3) 3000 units Tab Take  by mouth.     • aspirin 81 MG EC tablet Take 1 Tab by mouth every day. 90 Tab 3     No current facility-administered medications for this visit.       Allergies, past medical history, past surgical history, medications, family history, social history reviewed and updated.    ROS   Constitutional: Denies fevers or chills  Eyes: Denies changes in vision  Ears/Nose/Throat/Mouth: Denies nasal congestion or sore throat   Cardiovascular: Denies chest pain or palpitations   Respiratory: Denies shortness of breath , Denies cough  Gastrointestinal/Hepatic: Denies abd pain, nausea, vomiting   Genitourinary: Denies dysuria or frequency  Musculoskeletal/Rheum: Denies joint pain and swelling   Neurological: Denies headache  Psychiatric: Denies mood disorder   Endocrine: Denies hx of diabetes or thyroid dysfunction  Heme/Oncology/Lymph Nodes: Denies weight changes or enlarged LNs.    Physical Exam  Vitals: /74 (BP Location: Left arm, Patient Position: Sitting, BP Cuff Size: Adult)   Pulse 85    "Temp 36.6 °C (97.8 °F) (Temporal)   Resp 16   Ht 1.753 m (5' 9\")   Wt 111 kg (244 lb 11.4 oz)   SpO2 97%   BMI 36.14 kg/m²   General: Alert, pleasant, NAD  HEENT: Normocephalic.  EOMI, no icterus or pallor.  Conjunctivae and lids normal. External ears normal. Wearing a mask. Oropharynx non-erythematous, mucous membranes moist.  Neck supple.  No thyromegaly or masses palpated.   Lymph: No cervical or supraclavicular lymphadenopathy.  Cardiovascular: Regular rate and rhythm.  S1 and S2 normal.  No murmurs appreciated.  Respiratory: Normal respiratory effort.  Clear to auscultation bilaterally.  Abdomen: Non-distended, soft, non-tender  Skin: Warm, dry, no rashes.  Musculoskeletal: Gait is normal.  Moves all extremities well.  Extremities: No leg edema.  Radial pulses 2+ symmetric.   Psych:  Affect/mood is normal, judgement is good, memory is intact, grooming is appropriate.    Knee exam:     Inspection: symmetric knee and quadriceps.   Palpation:  normal temperature of knee (normally knee is cooler than shin)    Posterior sag sign(PCL): negative   Posterior drawer sign(PCL): positive  Anterior drawer test (ACL): intact      Palpate the medial and lateral collateral ligaments: no crepitus/tenderness bilaterally.    Varus stress test (LCL): positive    Valgus stress test (MCL): Positive    Joann test: positive    Palpate the medial and lateral underside of patella: tender.      Assessment and Plan     Chronic pain of right knee  - REFERRAL TO OUTPATIENT INTERVENTIONAL PAIN CLINIC  - DX-KNEE COMPLETE 4+ RIGHT; Future  OTC ibupren 400 mg every 6-8 hour prn with food  OTC topical capsaicin or diclofenac cream   Seek medical attention if symptoms worsen      Follow-up:Return if symptoms worsen or fail to improve.    This note was created using voice recognition software. There may be unintended errors in spelling, grammar or content.    "

## 2021-04-26 ENCOUNTER — APPOINTMENT (RX ONLY)
Dept: URBAN - METROPOLITAN AREA CLINIC 31 | Facility: CLINIC | Age: 81
Setting detail: DERMATOLOGY
End: 2021-04-26

## 2021-04-26 VITALS — HEART RATE: 72 BPM | SYSTOLIC BLOOD PRESSURE: 158 MMHG | DIASTOLIC BLOOD PRESSURE: 80 MMHG | OXYGEN SATURATION: 97 %

## 2021-04-26 PROBLEM — C44.229 SQUAMOUS CELL CARCINOMA OF SKIN OF LEFT EAR AND EXTERNAL AURICULAR CANAL: Status: ACTIVE | Noted: 2021-04-26

## 2021-04-26 PROCEDURE — 13151 CMPLX RPR E/N/E/L 1.1-2.5 CM: CPT

## 2021-04-26 PROCEDURE — ? MOHS SURGERY

## 2021-04-26 PROCEDURE — 17311 MOHS 1 STAGE H/N/HF/G: CPT

## 2021-04-26 NOTE — PROCEDURE: MIPS QUALITY
Quality 111:Pneumonia Vaccination Status For Older Adults: Pneumococcal Vaccination Previously Received
Quality 130: Documentation Of Current Medications In The Medical Record: Current Medications Documented
Quality 265: Biopsy Follow-Up: Biopsy results reviewed, communicated, tracked, and documented
Quality 431: Preventive Care And Screening: Unhealthy Alcohol Use - Screening: Patient screened for unhealthy alcohol use using a single question and scores less than 2 times per year
Detail Level: Detailed
Quality 110: Preventive Care And Screening: Influenza Immunization: Influenza Immunization Administered during Influenza season
Quality 226: Preventive Care And Screening: Tobacco Use: Screening And Cessation Intervention: Patient screened for tobacco use and is an ex/non-smoker

## 2021-04-26 NOTE — PROCEDURE: MOHS SURGERY
Validate Referring Provider (Can Hide Referring Provider In Settings Tab): No
Ear Wedge Repair Text: A wedge excision was completed by carrying down an excision through the full thickness of the ear and cartilage with an inward facing Burow's triangle. The wound was then closed in a layered fashion.
Oculoplastic Surgeon Procedure Text (F): After obtaining clear surgical margins the patient was sent to oculoplastics for surgical repair.  The patient understands they will receive post-surgical care and follow-up from the referring physician's office.
Plastic Surgeon Procedure Text (B): After obtaining clear surgical margins the patient was sent to plastics for surgical repair.  The patient understands they will receive post-surgical care and follow-up from the referring physician's office.
X Size Of Lesion In Cm (Optional): 0.4
Incorporate Mauc Into Note After Indications: Yes
Dorsal Nasal Flap Text: The defect edges were debeveled with a #15 scalpel blade.  Given the location of the defect and the proximity to free margins a dorsal nasal flap was deemed most appropriate.  Using a sterile surgical marker, an appropriate dorsal nasal flap was drawn around the defect.    The area thus outlined was incised deep to adipose tissue with a #15 scalpel blade.  The skin margins were undermined to an appropriate distance in all directions utilizing iris scissors.
Special Stains Stage 2 - Results: Base On Clearance Noted Above
Suturegard Body: The suture ends were repeatedly re-tightened and re-clamped to achieve the desired tissue expansion.
Hemigard Retention Suture: 0-0 Nylon
Posterior Auricular Interpolation Flap Text: A decision was made to reconstruct the defect utilizing an interpolation axial flap and a staged reconstruction.  A telfa template was made of the defect.  This telfa template was then used to outline the posterior auricular interpolation flap.  The donor area for the pedicle flap was then injected with anesthesia.  The flap was excised through the skin and subcutaneous tissue down to the layer of the underlying musculature.  The pedicle flap was carefully excised within this deep plane to maintain its blood supply.  The edges of the donor site were undermined.   The donor site was closed in a primary fashion.  The pedicle was then rotated into position and sutured.  Once the tube was sutured into place, adequate blood supply was confirmed with blanching and refill.  The pedicle was then wrapped with xeroform gauze and dressed appropriately with a telfa and gauze bandage to ensure continued blood supply and protect the attached pedicle.
Information: Selecting Yes will display possible errors in your note based on the variables you have selected. This validation is only offered as a suggestion for you. PLEASE NOTE THAT THE VALIDATION TEXT WILL BE REMOVED WHEN YOU FINALIZE YOUR NOTE. IF YOU WANT TO FAX A PRELIMINARY NOTE YOU WILL NEED TO TOGGLE THIS TO 'NO' IF YOU DO NOT WANT IT IN YOUR FAXED NOTE.
Rotation Flap Text: The defect edges were debeveled with a #15 scalpel blade.  Given the location of the defect, shape of the defect and the proximity to free margins a rotation flap was deemed most appropriate.  Using a sterile surgical marker, an appropriate rotation flap was drawn incorporating the defect and placing the expected incisions within the relaxed skin tension lines where possible.    The area thus outlined was incised deep to adipose tissue with a #15 scalpel blade.  The skin margins were undermined to an appropriate distance in all directions utilizing iris scissors.
Bcc  Nodular Histology Text: There were numerous nodular aggregates of basaloid cells in the dermis with atypical cell morphology.
Scc Well Differentiated Histology Text: There are nests of well-differentiated atypical squamous epithelial cells arising from the epidermis and extending into the dermis with keratin pearls and atypical cell morphology.
O-L Flap Text: The defect edges were debeveled with a #15 scalpel blade.  Given the location of the defect, shape of the defect and the proximity to free margins an O-L flap was deemed most appropriate.  Using a sterile surgical marker, an appropriate advancement flap was drawn incorporating the defect and placing the expected incisions within the relaxed skin tension lines where possible.    The area thus outlined was incised deep to adipose tissue with a #15 scalpel blade.  The skin margins were undermined to an appropriate distance in all directions utilizing iris scissors.
Number Of Hemigard Strips Per Side: 1
Retention Suture Text: Retention sutures were placed to support the closure and prevent dehiscence.
Purse String (Simple) Text: Given the location of the defect and the characteristics of the surrounding skin a purse string closure was deemed most appropriate.  Undermining was performed circumfirentially around the surgical defect.  A purse string suture was then placed and tightened.
Localized Dermabrasion With Wire Brush Text: The patient was draped in routine manner.  Localized dermabrasion using 3 x 17 mm wire brush was performed in routine manner to papillary dermis. This spot dermabrasion is being performed to complete skin cancer reconstruction. It also will eliminate the other sun damaged precancerous cells that are known to be part of the regional effect of a lifetime's worth of sun exposure. This localized dermabrasion is therapeutic and should not be considered cosmetic in any regard.
Dermal Autograft Text: The defect edges were debeveled with a #15 scalpel blade.  Given the location of the defect, shape of the defect and the proximity to free margins a dermal autograft was deemed most appropriate.  Using a sterile surgical marker, the primary defect shape was transferred to the donor site. The area thus outlined was incised deep to adipose tissue with a #15 scalpel blade.  The harvested graft was then trimmed of adipose and epidermal tissue until only dermis was left.  The skin graft was then placed in the primary defect and oriented appropriately.
Repair Performed By Another Provider Text (Leave Blank If You Do Not Want): After obtaining clear surgical margins the defect was repaired by another provider.
Mart-1 - Negative Histology Text: MART-1 staining demonstrates a normal density and pattern of melanocytes along the dermal-epidermal junction. The surgical margins are negative for tumor cells.
Epidermal Closure: running cuticular
V-Y Plasty Text: The defect edges were debeveled with a #15 scalpel blade.  Given the location of the defect, shape of the defect and the proximity to free margins an V-Y advancement flap was deemed most appropriate.  Using a sterile surgical marker, an appropriate advancement flap was drawn incorporating the defect and placing the expected incisions within the relaxed skin tension lines where possible.    The area thus outlined was incised deep to adipose tissue with a #15 scalpel blade.  The skin margins were undermined to an appropriate distance in all directions utilizing iris scissors.
Tumor Depth: Less than 6mm from granular layer and no invasion beyond the subcutaneous fat
Nostril Rim Text: The closure involved the nostril rim.
Unna Boot Text: An Unna boot was placed to help immobilize the limb and facilitate more rapid healing.
Ftsg Text: The defect edges were debeveled with a #15 scalpel blade.  Given the location of the defect, shape of the defect and the proximity to free margins a full thickness skin graft was deemed most appropriate.  Using a sterile surgical marker, the primary defect shape was transferred to the donor site. The area thus outlined was incised deep to adipose tissue with a #15 scalpel blade.  The harvested graft was then trimmed of adipose tissue until only dermis and epidermis was left.  The skin margins of the secondary defect were undermined to an appropriate distance in all directions utilizing iris scissors.  The secondary defect was closed with interrupted buried subcutaneous sutures.  The skin edges were then re-apposed with running  sutures.  The skin graft was then placed in the primary defect and oriented appropriately.
Split-Thickness Skin Graft Text: The defect edges were debeveled with a #15 scalpel blade.  Given the location of the defect, shape of the defect and the proximity to free margins a split thickness skin graft was deemed most appropriate.  Using a sterile surgical marker, the primary defect shape was transferred to the donor site. The split thickness graft was then harvested.  The skin graft was then placed in the primary defect and oriented appropriately.
Stage 14: Additional Anesthesia Volume In Cc: 0
Scc Histology Text: There are nests of atypical squamous epithelial cells arising from the epidermis and extending into the dermis.
Hemigard Intro: Due to skin fragility and wound tension, it was decided to use HEMIGARD adhesive retention suture devices to permit a linear closure. The skin was cleaned and dried for a 6cm distance away from the wound. Excessive hair, if present, was removed to allow for adhesion.
Island Pedicle Flap With Canthal Suspension Text: The defect edges were debeveled with a #15 scalpel blade.  Given the location of the defect, shape of the defect and the proximity to free margins an island pedicle advancement flap was deemed most appropriate.  Using a sterile surgical marker, an appropriate advancement flap was drawn incorporating the defect, outlining the appropriate donor tissue and placing the expected incisions within the relaxed skin tension lines where possible. The area thus outlined was incised deep to adipose tissue with a #15 scalpel blade.  The skin margins were undermined to an appropriate distance in all directions around the primary defect and laterally outward around the island pedicle utilizing iris scissors.  There was minimal undermining beneath the pedicle flap. A suspension suture was placed in the canthal tendon to prevent tension and prevent ectropion.
Otolaryngologist Procedure Text (C): After obtaining clear surgical margins the patient was sent to otolaryngology for surgical repair.  The patient understands they will receive post-surgical care and follow-up from the referring physician's office.
Mohs Rapid Report Verbiage: The area of clinically evident tumor was marked with skin marking ink and appropriately hatched.  The initial incision was made following the Mohs approach through the skin.  The specimen was taken to the lab, divided into the necessary number of pieces, chromacoded and processed according to the Mohs protocol.  This was repeated in successive stages until a tumor free defect was achieved.
Stage 6: Additional Anesthesia Type: 1% lidocaine with epinephrine
Wound Care: Vaseline
Double O-Z Flap Text: The defect edges were debeveled with a #15 scalpel blade.  Given the location of the defect, shape of the defect and the proximity to free margins a Double O-Z flap was deemed most appropriate.  Using a sterile surgical marker, an appropriate transposition flap was drawn incorporating the defect and placing the expected incisions within the relaxed skin tension lines where possible. The area thus outlined was incised deep to adipose tissue with a #15 scalpel blade.  The skin margins were undermined to an appropriate distance in all directions utilizing iris scissors.
Primary Defect Length In Cm (Final Defect Size - Required For Flaps/Grafts): 0.5
Mohs Method Verbiage: An incision at a 45 degree angle following the standard Mohs approach was done and the specimen was harvested as a microscopic controlled layer.
Mixed Nodular And Micronodular Bcc Histology Text: There were numerous nodular and micronodular aggregates of basaloid cells in the dermis with atypical cell morphology.
Advancement Flap (Single) Text: The defect edges were debeveled with a #15 scalpel blade.  Given the location of the defect and the proximity to free margins a single advancement flap was deemed most appropriate.  Using a sterile surgical marker, an appropriate advancement flap was drawn incorporating the defect and placing the expected incisions within the relaxed skin tension lines where possible.    The area thus outlined was incised deep to adipose tissue with a #15 scalpel blade.  The skin margins were undermined to an appropriate distance in all directions utilizing iris scissors.
Island Pedicle Flap-Requiring Vessel Identification Text: The defect edges were debeveled with a #15 scalpel blade.  Given the location of the defect, shape of the defect and the proximity to free margins an island pedicle advancement flap was deemed most appropriate.  Using a sterile surgical marker, an appropriate advancement flap was drawn, based on the axial vessel mentioned above, incorporating the defect, outlining the appropriate donor tissue and placing the expected incisions within the relaxed skin tension lines where possible.    The area thus outlined was incised deep to adipose tissue with a #15 scalpel blade.  The skin margins were undermined to an appropriate distance in all directions around the primary defect and laterally outward around the island pedicle utilizing iris scissors.  There was minimal undermining beneath the pedicle flap.
S Plasty Text: Given the location and shape of the defect, and the orientation of relaxed skin tension lines, an S-plasty was deemed most appropriate for repair.  Using a sterile surgical marker, the appropriate outline of the S-plasty was drawn, incorporating the defect and placing the expected incisions within the relaxed skin tension lines where possible.  The area thus outlined was incised deep to adipose tissue with a #15 scalpel blade.  The skin margins were undermined to an appropriate distance in all directions utilizing iris scissors. The skin flaps were advanced over the defect.  The opposing margins were then approximated with interrupted buried subcutaneous sutures.
Manual Repair Warning Statement: We plan on removing the manually selected variable below in favor of our much easier automatic structured text blocks found in the previous tab. We decided to do this to help make the flow better and give you the full power of structured data. Manual selection is never going to be ideal in our platform and I would encourage you to avoid using manual selection from this point on, especially since I will be sunsetting this feature. It is important that you do one of two things with the customized text below. First, you can save all of the text in a word file so you can have it for future reference. Second, transfer the text to the appropriate area in the Library tab. Lastly, if there is a flap or graft type which we do not have you need to let us know right away so I can add it in before the variable is hidden. No need to panic, we plan to give you roughly 6 months to make the change.
Medical Necessity Statement: Based on my medical judgement, Mohs surgery is the most appropriate treatment for this cancer compared to other treatments.
Asc Procedure Text (A): After obtaining clear surgical margins the patient was sent to an ASC for surgical repair.  The patient understands they will receive post-surgical care and follow-up from the ASC physician.
Crescentic Complex Repair Preamble Text (Leave Blank If You Do Not Want): Extensive wide undermining was performed.
Consent (Scalp)/Introductory Paragraph: The rationale for Mohs was explained to the patient and consent was obtained. The risks, benefits and alternatives to therapy were discussed in detail. Specifically, the risks of changes in hair growth pattern secondary to repair, infection, scarring, bleeding, prolonged wound healing, incomplete removal, allergy to anesthesia, nerve injury and recurrence were addressed. Prior to the procedure, the treatment site was clearly identified and confirmed by the patient. All components of Universal Protocol/PAUSE Rule completed.
Surgeon Performing Repair (Optional): Shawna Dunn MD
Mixed Nodular And Infiltrative Bcc Histology Text: There were numerous nodular and infiltrative aggregates of basaloid cells in the dermis with atypical cell morphology.
O-Z Flap Text: The defect edges were debeveled with a #15 scalpel blade.  Given the location of the defect, shape of the defect and the proximity to free margins an O-Z flap was deemed most appropriate.  Using a sterile surgical marker, an appropriate transposition flap was drawn incorporating the defect and placing the expected incisions within the relaxed skin tension lines where possible. The area thus outlined was incised deep to adipose tissue with a #15 scalpel blade.  The skin margins were undermined to an appropriate distance in all directions utilizing iris scissors.
Simple / Intermediate / Complex Repair - Final Wound Length In Cm: 1.4
Cheek-To-Nose Interpolation Flap Text: A decision was made to reconstruct the defect utilizing an interpolation axial flap and a staged reconstruction.  A telfa template was made of the defect.  This telfa template was then used to outline the Cheek-To-Nose Interpolation flap.  The donor area for the pedicle flap was then injected with anesthesia.  The flap was excised through the skin and subcutaneous tissue down to the layer of the underlying musculature.  The interpolation flap was carefully excised within this deep plane to maintain its blood supply.  The edges of the donor site were undermined.   The donor site was closed in a primary fashion.  The pedicle was then rotated into position and sutured.  Once the tube was sutured into place, adequate blood supply was confirmed with blanching and refill.  The pedicle was then wrapped with xeroform gauze and dressed appropriately with a telfa and gauze bandage to ensure continued blood supply and protect the attached pedicle.
Scc In Situ With Follicular Extension Histology Text: There are glassy pink variably sized keratinocytes in the epidermis and extending down the follicles with full thickness atypia and atypical cell morphology.
Partial Purse String (Intermediate) Text: Given the location of the defect and the characteristics of the surrounding skin an intermediate purse string closure was deemed most appropriate.  Undermining was performed circumfirentially around the surgical defect.  A purse string suture was then placed and tightened. Wound tension only allowed a partial closure of the circular defect.
Consent (Marginal Mandibular)/Introductory Paragraph: The rationale for Mohs was explained to the patient and consent was obtained. The risks, benefits and alternatives to therapy were discussed in detail. Specifically, the risks of damage to the marginal mandibular branch of the facial nerve, infection, scarring, bleeding, prolonged wound healing, incomplete removal, allergy to anesthesia, and recurrence were addressed. Prior to the procedure, the treatment site was clearly identified and confirmed by the patient. All components of Universal Protocol/PAUSE Rule completed.
Closure 4 Information: This tab is for additional flaps and grafts above and beyond our usual structured repairs.  Please note if you enter information here it will not currently bill and you will need to add the billing information manually.
Bcc Micronodular Histology Text: There were numerous micronodular aggregates of basaloid cells in the dermis with atypical cell morphology.
No Residual Tumor Seen Histology Text: There were no malignant cells seen in the sections examined.
Skin Substitute Text: The defect edges were debeveled with a #15 scalpel blade.  Given the location of the defect, shape of the defect and the proximity to free margins a skin substitute graft was deemed most appropriate.  The graft material was trimmed to fit the size of the defect. The graft was then placed in the primary defect and oriented appropriately.
Star Wedge Flap Text: The defect edges were debeveled with a #15 scalpel blade.  Given the location of the defect, shape of the defect and the proximity to free margins a star wedge flap was deemed most appropriate.  Using a sterile surgical marker, an appropriate rotation flap was drawn incorporating the defect and placing the expected incisions within the relaxed skin tension lines where possible. The area thus outlined was incised deep to adipose tissue with a #15 scalpel blade.  The skin margins were undermined to an appropriate distance in all directions utilizing iris scissors.
Burow's Advancement Flap Text: The defect edges were debeveled with a #15 scalpel blade.  Given the location of the defect and the proximity to free margins a Burow's advancement flap was deemed most appropriate.  Using a sterile surgical marker, the appropriate advancement flap was drawn incorporating the defect and placing the expected incisions within the relaxed skin tension lines where possible.    The area thus outlined was incised deep to adipose tissue with a #15 scalpel blade.  The skin margins were undermined to an appropriate distance in all directions utilizing iris scissors.
Mid-Level Procedure Text (D): After obtaining clear surgical margins the patient was sent to a mid-level provider for surgical repair.  The patient understands they will receive post-surgical care and follow-up from the mid-level provider.
Hatchet Flap Text: The defect edges were debeveled with a #15 scalpel blade.  Given the location of the defect, shape of the defect and the proximity to free margins a hatchet flap was deemed most appropriate.  Using a sterile surgical marker, an appropriate hatchet flap was drawn incorporating the defect and placing the expected incisions within the relaxed skin tension lines where possible.    The area thus outlined was incised deep to adipose tissue with a #15 scalpel blade.  The skin margins were undermined to an appropriate distance in all directions utilizing iris scissors.
O-T Plasty Text: The defect edges were debeveled with a #15 scalpel blade.  Given the location of the defect, shape of the defect and the proximity to free margins an O-T plasty was deemed most appropriate.  Using a sterile surgical marker, an appropriate O-T plasty was drawn incorporating the defect and placing the expected incisions within the relaxed skin tension lines where possible.    The area thus outlined was incised deep to adipose tissue with a #15 scalpel blade.  The skin margins were undermined to an appropriate distance in all directions utilizing iris scissors.
Helical Rim Text: The closure involved the helical rim.
Retention Suture Bite Size: 1 mm
Graft Donor Site Dermal Sutures (Optional): 5-0 Polysorb
Mohs Histo Method Verbiage: Each section was then chromacoded and processed in the Mohs lab using the Mohs protocol and submitted for frozen section.
Donor Site Anesthesia Type: same as repair anesthesia
Orbicularis Oris Muscle Flap Text: The defect edges were debeveled with a #15 scalpel blade.  Given that the defect affected the competency of the oral sphincter an obicularis oris muscle flap was deemed most appropriate to restore this competency and normal muscle function.  Using a sterile surgical marker, an appropriate flap was drawn incorporating the defect. The area thus outlined was incised with a #15 scalpel blade.
Subsequent Stages Histo Method Verbiage: Using a similar technique to that described above, a thin layer of tissue was removed from all areas where tumor was visible on the previous stage.  The tissue was again oriented, mapped, dyed, and processed as above.
Keystone Flap Text: The defect edges were debeveled with a #15 scalpel blade.  Given the location of the defect, shape of the defect a keystone flap was deemed most appropriate.  Using a sterile surgical marker, an appropriate keystone flap was drawn incorporating the defect, outlining the appropriate donor tissue and placing the expected incisions within the relaxed skin tension lines where possible. The area thus outlined was incised deep to adipose tissue with a #15 scalpel blade.  The skin margins were undermined to an appropriate distance in all directions around the primary defect and laterally outward around the flap utilizing iris scissors.
Eye Protection Verbiage: Before proceeding with the stage, a plastic scleral shield was inserted. The globe was anesthetized with a few drops of 1% lidocaine with 1:100,000 epinephrine. Then, an appropriate sized scleral shield was chosen and coated with lacrilube ointment. The shield was gently inserted and left in place for the duration of each stage. After the stage was completed, the shield was gently removed.
Chonodrocutaneous Helical Advancement Flap Text: The defect edges were debeveled with a #15 scalpel blade.  Given the location of the defect and the proximity to free margins a chondrocutaneous helical advancement flap was deemed most appropriate.  Using a sterile surgical marker, the appropriate advancement flap was drawn incorporating the defect and placing the expected incisions within the relaxed skin tension lines where possible.    The area thus outlined was incised deep to adipose tissue with a #15 scalpel blade.  The skin margins were undermined to an appropriate distance in all directions utilizing iris scissors.
Muscle Hinge Flap Text: The defect edges were debeveled with a #15 scalpel blade.  Given the size, depth and location of the defect and the proximity to free margins a muscle hinge flap was deemed most appropriate.  Using a sterile surgical marker, an appropriate hinge flap was drawn incorporating the defect. The area thus outlined was incised with a #15 scalpel blade.  The skin margins were undermined to an appropriate distance in all directions utilizing iris scissors.
O-Z Plasty Text: The defect edges were debeveled with a #15 scalpel blade.  Given the location of the defect, shape of the defect and the proximity to free margins an O-Z plasty (double transposition flap) was deemed most appropriate.  Using a sterile surgical marker, the appropriate transposition flaps were drawn incorporating the defect and placing the expected incisions within the relaxed skin tension lines where possible.    The area thus outlined was incised deep to adipose tissue with a #15 scalpel blade.  The skin margins were undermined to an appropriate distance in all directions utilizing iris scissors.  Hemostasis was achieved with electrocautery.  The flaps were then transposed into place, one clockwise and the other counterclockwise, and anchored with interrupted buried subcutaneous sutures.
Referring Physician (Optional): Dr. Moreno
Bcc Histology Text: There were numerous aggregates of basaloid cells in the dermis with atypical cell morphology
Spiral Flap Text: The defect edges were debeveled with a #15 scalpel blade.  Given the location of the defect, shape of the defect and the proximity to free margins a spiral flap was deemed most appropriate.  Using a sterile surgical marker, an appropriate rotation flap was drawn incorporating the defect and placing the expected incisions within the relaxed skin tension lines where possible. The area thus outlined was incised deep to adipose tissue with a #15 scalpel blade.  The skin margins were undermined to an appropriate distance in all directions utilizing iris scissors.
Mucosal Advancement Flap Text: Given the location of the defect, shape of the defect and the proximity to free margins a mucosal advancement flap was deemed most appropriate. Incisions were made with a 15 blade scalpel in the appropriate fashion along the cutaneous vermilion border and the mucosal lip. The remaining actinically damaged mucosal tissue was excised.  The mucosal advancement flap was then elevated to the gingival sulcus with care taken to preserve the neurovascular structures and advanced into the primary defect. Care was taken to ensure that precise realignment of the vermilion border was achieved.
Wound Care (No Sutures): Petrolatum
Consent 2/Introductory Paragraph: Mohs surgery was explained to the patient and consent was obtained. The risks, benefits and alternatives to therapy were discussed in detail. Specifically, the risks of infection, scarring, bleeding, prolonged wound healing, incomplete removal, allergy to anesthesia, nerve injury and recurrence were addressed. Prior to the procedure, the treatment site was clearly identified and confirmed by the patient. All components of Universal Protocol/PAUSE Rule completed.
Melolabial Transposition Flap Text: The defect edges were debeveled with a #15 scalpel blade.  Given the location of the defect and the proximity to free margins a melolabial flap was deemed most appropriate.  Using a sterile surgical marker, an appropriate melolabial transposition flap was drawn incorporating the defect.    The area thus outlined was incised deep to adipose tissue with a #15 scalpel blade.  The skin margins were undermined to an appropriate distance in all directions utilizing iris scissors.
Nasal Turnover Hinge Flap Text: The defect edges were debeveled with a #15 scalpel blade.  Given the size, depth, location of the defect and the defect being full thickness a nasal turnover hinge flap was deemed most appropriate.  Using a sterile surgical marker, an appropriate hinge flap was drawn incorporating the defect. The area thus outlined was incised with a #15 scalpel blade. The flap was designed to recreate the nasal mucosal lining and the alar rim. The skin margins were undermined to an appropriate distance in all directions utilizing iris scissors.
Crescentic Intermediate Repair Preamble Text (Leave Blank If You Do Not Want): Undermining was performed with blunt dissection.
Vermilion Border Text: The closure involved the vermilion border.
Alar Island Pedicle Flap Text: The defect edges were debeveled with a #15 scalpel blade.  Given the location of the defect, shape of the defect and the proximity to the alar rim an island pedicle advancement flap was deemed most appropriate.  Using a sterile surgical marker, an appropriate advancement flap was drawn incorporating the defect, outlining the appropriate donor tissue and placing the expected incisions within the nasal ala running parallel to the alar rim. The area thus outlined was incised with a #15 scalpel blade.  The skin margins were undermined minimally to an appropriate distance in all directions around the primary defect and laterally outward around the island pedicle utilizing iris scissors.  There was minimal undermining beneath the pedicle flap.
Partial Purse String (Simple) Text: Given the location of the defect and the characteristics of the surrounding skin a simple purse string closure was deemed most appropriate.  Undermining was performed circumfirentially around the surgical defect.  A purse string suture was then placed and tightened. Wound tension only allowed a partial closure of the circular defect.
Is There Documentation In The Chart Showing Discussion Of Changes With Another Physician?: Please Select the Appropriate Response
Repair Type: Complex Repair
Missing Epidermis Histology Text: There was focal missing epidermis on the sections examined.
Date Of Previous Biopsy (Optional): 04/02/21
Closure 2 Information: This tab is for additional flaps and grafts, including complex repair and grafts and complex repair and flaps. You can also specify a different location for the additional defect, if the location is the same you do not need to select a new one. We will insert the automated text for the repair you select below just as we do for solitary flaps and grafts. Please note that at this time if you select a location with a different insurance zone you will need to override the ICD10 and CPT if appropriate.
No Repair - Repaired With Adjacent Surgical Defect Text (Leave Blank If You Do Not Want): After obtaining clear surgical margins the defect was repaired concurrently with another surgical defect which was in close approximation.
Full Thickness Lip Wedge Repair (Flap) Text: Given the location of the defect and the proximity to free margins a full thickness wedge repair was deemed most appropriate.  Using a sterile surgical marker, the appropriate repair was drawn incorporating the defect and placing the expected incisions perpendicular to the vermilion border.  The vermilion border was also meticulously outlined to ensure appropriate reapproximation during the repair.  The area thus outlined was incised through and through with a #15 scalpel blade.  The muscularis and dermis were reaproximated with deep sutures following hemostasis. Care was taken to realign the vermilion border before proceeding with the superficial closure.  Once the vermilion was realigned the superfical and mucosal closure was finished.
Crescentic Advancement Flap Text: The defect edges were debeveled with a #15 scalpel blade.  Given the location of the defect and the proximity to free margins a crescentic advancement flap was deemed most appropriate.  Using a sterile surgical marker, the appropriate advancement flap was drawn incorporating the defect and placing the expected incisions within the relaxed skin tension lines where possible.    The area thus outlined was incised deep to adipose tissue with a #15 scalpel blade.  The skin margins were undermined to an appropriate distance in all directions utilizing iris scissors.
Location Indication Override (Is Already Calculated Based On Selected Body Location): Area H
Consent (Lip)/Introductory Paragraph: The rationale for Mohs was explained to the patient and consent was obtained. The risks, benefits and alternatives to therapy were discussed in detail. Specifically, the risks of lip deformity, changes in the oral aperture, infection, scarring, bleeding, prolonged wound healing, incomplete removal, allergy to anesthesia, nerve injury and recurrence were addressed. Prior to the procedure, the treatment site was clearly identified and confirmed by the patient. All components of Universal Protocol/PAUSE Rule completed.
Repair Anesthesia Method: local infiltration
Epidermal Autograft Text: The defect edges were debeveled with a #15 scalpel blade.  Given the location of the defect, shape of the defect and the proximity to free margins an epidermal autograft was deemed most appropriate.  Using a sterile surgical marker, the primary defect shape was transferred to the donor site. The epidermal graft was then harvested.  The skin graft was then placed in the primary defect and oriented appropriately.
Hemigard Postcare Instructions: The HEMIGARD strips are to remain completely dry for at least 5-7 days.
O-T Advancement Flap Text: The defect edges were debeveled with a #15 scalpel blade.  Given the location of the defect, shape of the defect and the proximity to free margins an O-T advancement flap was deemed most appropriate.  Using a sterile surgical marker, an appropriate advancement flap was drawn incorporating the defect and placing the expected incisions within the relaxed skin tension lines where possible.    The area thus outlined was incised deep to adipose tissue with a #15 scalpel blade.  The skin margins were undermined to an appropriate distance in all directions utilizing iris scissors.
Consent 1/Introductory Paragraph: The rationale for Mohs was explained to the patient and consent was obtained. The risks, benefits and alternatives to therapy were discussed in detail. Specifically, the risks of infection, scarring, bleeding, prolonged wound healing, incomplete removal, allergy to anesthesia, nerve injury and recurrence were addressed. Prior to the procedure, the treatment site was clearly identified and confirmed by the patient. All components of Universal Protocol/PAUSE Rule completed.
Complex Repair And Graft Additional Text (Will Appearing After The Standard Complex Repair Text): The complex repair was not sufficient to completely close the primary defect. The remaining additional defect was repaired with the graft mentioned below.
Bcc Superficial Histology Text: There were numerous aggregates of basaloid cells budding off the epidermis with atypical cell morphology.
Bilobed Flap Text: The defect edges were debeveled with a #15 scalpel blade.  Given the location of the defect and the proximity to free margins a bilobe flap was deemed most appropriate.  Using a sterile surgical marker, an appropriate bilobe flap drawn around the defect.    The area thus outlined was incised deep to adipose tissue with a #15 scalpel blade.  The skin margins were undermined to an appropriate distance in all directions utilizing iris scissors.
Advancement-Rotation Flap Text: The defect edges were debeveled with a #15 scalpel blade.  Given the location of the defect, shape of the defect and the proximity to free margins an advancement-rotation flap was deemed most appropriate.  Using a sterile surgical marker, an appropriate flap was drawn incorporating the defect and placing the expected incisions within the relaxed skin tension lines where possible. The area thus outlined was incised deep to adipose tissue with a #15 scalpel blade.  The skin margins were undermined to an appropriate distance in all directions utilizing iris scissors.
Same Histology In Subsequent Stages Text: The pattern and morphology of the tumor is as described in the first stage.
Bilobed Transposition Flap Text: The defect edges were debeveled with a #15 scalpel blade.  Given the location of the defect and the proximity to free margins a bilobed transposition flap was deemed most appropriate.  Using a sterile surgical marker, an appropriate bilobe flap drawn around the defect.    The area thus outlined was incised deep to adipose tissue with a #15 scalpel blade.  The skin margins were undermined to an appropriate distance in all directions utilizing iris scissors.
Peng Advancement Flap Text: The defect edges were debeveled with a #15 scalpel blade.  Given the location of the defect, shape of the defect and the proximity to free margins a Peng advancement flap was deemed most appropriate.  Using a sterile surgical marker, an appropriate advancement flap was drawn incorporating the defect and placing the expected incisions within the relaxed skin tension lines where possible. The area thus outlined was incised deep to adipose tissue with a #15 scalpel blade.  The skin margins were undermined to an appropriate distance in all directions utilizing iris scissors.
Helical Rim Advancement Flap Text: The defect edges were debeveled with a #15 blade scalpel.  Given the location of the defect and the proximity to free margins (helical rim) a double helical rim advancement flap was deemed most appropriate.  Using a sterile surgical marker, the appropriate advancement flaps were drawn incorporating the defect and placing the expected incisions between the helical rim and antihelix where possible.  The area thus outlined was incised through and through with a #15 scalpel blade.  With a skin hook and iris scissors, the flaps were gently and sharply undermined and freed up.
Undermining Type: Entire Wound
Debridement Text: The wound edges were debrided prior to proceeding with the closure to facilitate wound healing.
Area L Indication Text: Tumors in this location are included in Area L (trunk and extremities).  Mohs surgery is indicated for larger tumors, or tumors with aggressive histologic features, in these anatomic locations.
Advancement Flap (Double) Text: The defect edges were debeveled with a #15 scalpel blade.  Given the location of the defect and the proximity to free margins a double advancement flap was deemed most appropriate.  Using a sterile surgical marker, the appropriate advancement flaps were drawn incorporating the defect and placing the expected incisions within the relaxed skin tension lines where possible.    The area thus outlined was incised deep to adipose tissue with a #15 scalpel blade.  The skin margins were undermined to an appropriate distance in all directions utilizing iris scissors.
Mohs Case Number: [id number]
Alternatives Discussed Intro (Do Not Add Period): I discussed alternative treatments to Mohs surgery and specifically discussed the risks and benefits of
Consent (Ear)/Introductory Paragraph: The rationale for Mohs was explained to the patient and consent was obtained. The risks, benefits and alternatives to therapy were discussed in detail. Specifically, the risks of ear deformity, infection, scarring, bleeding, prolonged wound healing, incomplete removal, allergy to anesthesia, nerve injury and recurrence were addressed. Prior to the procedure, the treatment site was clearly identified and confirmed by the patient. All components of Universal Protocol/PAUSE Rule completed.
Previous Accession (Optional): [id number].
Consent (Nose)/Introductory Paragraph: The rationale for Mohs was explained to the patient and consent was obtained. The risks, benefits and alternatives to therapy were discussed in detail. Specifically, the risks of nasal deformity, changes in the flow of air through the nose, infection, scarring, bleeding, prolonged wound healing, incomplete removal, allergy to anesthesia, nerve injury and recurrence were addressed. Prior to the procedure, the treatment site was clearly identified and confirmed by the patient. All components of Universal Protocol/PAUSE Rule completed.
Tissue Cultured Epidermal Autograft Text: The defect edges were debeveled with a #15 scalpel blade.  Given the location of the defect, shape of the defect and the proximity to free margins a tissue cultured epidermal autograft was deemed most appropriate.  The graft was then trimmed to fit the size of the defect.  The graft was then placed in the primary defect and oriented appropriately.
Mart-1 - Positive Histology Text: MART-1 staining demonstrates areas of higher density and clustering of melanocytes with Pagetoid spread upwards within the epidermis. The surgical margins are positive for tumor cells.
Mercedes Flap Text: The defect edges were debeveled with a #15 scalpel blade.  Given the location of the defect, shape of the defect and the proximity to free margins a Mercedes flap was deemed most appropriate.  Using a sterile surgical marker, an appropriate advancement flap was drawn incorporating the defect and placing the expected incisions within the relaxed skin tension lines where possible. The area thus outlined was incised deep to adipose tissue with a #15 scalpel blade.  The skin margins were undermined to an appropriate distance in all directions utilizing iris scissors.
Nasalis-Muscle-Based Myocutaneous Island Pedicle Flap Text: Using a #15 blade, an incision was made around the donor flap to the level of the nasalis muscle. Wide lateral undermining was then performed in both the subcutaneous plane above the nasalis muscle, and in a submuscular plane just above periosteum. This allowed the formation of a free nasalis muscle axial pedicle (based on the angular artery) which was still attached to the actual cutaneous flap, increasing its mobility and vascular viability. Hemostasis was obtained with pinpoint electrocoagulation. The flap was mobilized into position and the pivotal anchor points positioned and stabilized with buried interrupted sutures. Subcutaneous and dermal tissues were closed in a multilayered fashion with sutures. Tissue redundancies were excised, and the epidermal edges were apposed without significant tension and sutured with sutures.
Suturegard Intro: Intraoperative tissue expansion was performed, utilizing the SUTUREGARD device, in order to reduce wound tension.
Rhomboid Transposition Flap Text: The defect edges were debeveled with a #15 scalpel blade.  Given the location of the defect and the proximity to free margins a rhomboid transposition flap was deemed most appropriate.  Using a sterile surgical marker, an appropriate rhomboid flap was drawn incorporating the defect.    The area thus outlined was incised deep to adipose tissue with a #15 scalpel blade.  The skin margins were undermined to an appropriate distance in all directions utilizing iris scissors.
Modified Advancement Flap Text: The defect edges were debeveled with a #15 scalpel blade.  Given the location of the defect, shape of the defect and the proximity to free margins a modified advancement flap was deemed most appropriate.  Using a sterile surgical marker, an appropriate advancement flap was drawn incorporating the defect and placing the expected incisions within the relaxed skin tension lines where possible.    The area thus outlined was incised deep to adipose tissue with a #15 scalpel blade.  The skin margins were undermined to an appropriate distance in all directions utilizing iris scissors.
Paramedian Forehead Flap Text: A decision was made to reconstruct the defect utilizing an interpolation axial flap and a staged reconstruction.  A telfa template was made of the defect.  This telfa template was then used to outline the paramedian forehead pedicle flap.  The donor area for the pedicle flap was then injected with anesthesia.  The flap was excised through the skin and subcutaneous tissue down to the layer of the underlying musculature.  The pedicle flap was carefully excised within this deep plane to maintain its blood supply.  The edges of the donor site were undermined.   The donor site was closed in a primary fashion.  The pedicle was then rotated into position and sutured.  Once the tube was sutured into place, adequate blood supply was confirmed with blanching and refill.  The pedicle was then wrapped with xeroform gauze and dressed appropriately with a telfa and gauze bandage to ensure continued blood supply and protect the attached pedicle.
Melolabial Interpolation Flap Text: A decision was made to reconstruct the defect utilizing an interpolation axial flap and a staged reconstruction.  A telfa template was made of the defect.  This telfa template was then used to outline the melolabial interpolation flap.  The donor area for the pedicle flap was then injected with anesthesia.  The flap was excised through the skin and subcutaneous tissue down to the layer of the underlying musculature.  The pedicle flap was carefully excised within this deep plane to maintain its blood supply.  The edges of the donor site were undermined.   The donor site was closed in a primary fashion.  The pedicle was then rotated into position and sutured.  Once the tube was sutured into place, adequate blood supply was confirmed with blanching and refill.  The pedicle was then wrapped with xeroform gauze and dressed appropriately with a telfa and gauze bandage to ensure continued blood supply and protect the attached pedicle.
Z Plasty Text: The lesion was extirpated to the level of the fat with a #15 scalpel blade.  Given the location of the defect, shape of the defect and the proximity to free margins a Z-plasty was deemed most appropriate for repair.  Using a sterile surgical marker, the appropriate transposition arms of the Z-plasty were drawn incorporating the defect and placing the expected incisions within the relaxed skin tension lines where possible.    The area thus outlined was incised deep to adipose tissue with a #15 scalpel blade.  The skin margins were undermined to an appropriate distance in all directions utilizing iris scissors.  The opposing transposition arms were then transposed into place in opposite direction and anchored with interrupted buried subcutaneous sutures.
Purse String (Intermediate) Text: Given the location of the defect and the characteristics of the surrounding skin a purse string intermediate closure was deemed most appropriate.  Undermining was performed circumfirentially around the surgical defect.  A purse string suture was then placed and tightened.
Consent (Temporal Branch)/Introductory Paragraph: The rationale for Mohs was explained to the patient and consent was obtained. The risks, benefits and alternatives to therapy were discussed in detail. Specifically, the risks of damage to the temporal branch of the facial nerve, infection, scarring, bleeding, prolonged wound healing, incomplete removal, allergy to anesthesia, and recurrence were addressed. Prior to the procedure, the treatment site was clearly identified and confirmed by the patient. All components of Universal Protocol/PAUSE Rule completed.
Postop Diagnosis: same
Graft Donor Site Bandage (Optional-Leave Blank If You Don't Want In Note): Aquaplast was fitted to the graft site and sewn into place. A pressure bandage were applied to the donor site and over the aquaplast bolster.
Rhombic Flap Text: The defect edges were debeveled with a #15 scalpel blade.  Given the location of the defect and the proximity to free margins a rhombic flap was deemed most appropriate.  Using a sterile surgical marker, an appropriate rhombic flap was drawn incorporating the defect.    The area thus outlined was incised deep to adipose tissue with a #15 scalpel blade.  The skin margins were undermined to an appropriate distance in all directions utilizing iris scissors.
Hemostasis: Electrocautery
Graft Donor Site Epidermal Sutures (Optional): 5-0 Surgipro
Epidermal Closure Graft Donor Site (Optional): running
Composite Graft Text: The defect edges were debeveled with a #15 scalpel blade.  Given the location of the defect, shape of the defect, the proximity to free margins and the fact the defect was full thickness a composite graft was deemed most appropriate.  The defect was outline and then transferred to the donor site.  A full thickness graft was then excised from the donor site. The graft was then placed in the primary defect, oriented appropriately and then sutured into place.  The secondary defect was then repaired using a primary closure.
Zygomaticofacial Flap Text: Given the location of the defect, shape of the defect and the proximity to free margins a zygomaticofacial flap was deemed most appropriate for repair.  Using a sterile surgical marker, the appropriate flap was drawn incorporating the defect and placing the expected incisions within the relaxed skin tension lines where possible. The area thus outlined was incised deep to adipose tissue with a #15 scalpel blade with preservation of a vascular pedicle.  The skin margins were undermined to an appropriate distance in all directions utilizing iris scissors.  The flap was then placed into the defect and anchored with interrupted buried subcutaneous sutures.
Mixed Superficial And Nodular Bcc Histology Text: There were numerous nodular aggregates of basaloid cells in the dermis as well as budding off the epidermis with atypical cell morphology.
Inflammation Suggestive Of Cancer Camouflage Histology Text: There was a dense lymphocytic infiltrate which prevented adequate histologic evaluation of adjacent structures.
Double O-Z Plasty Text: The defect edges were debeveled with a #15 scalpel blade.  Given the location of the defect, shape of the defect and the proximity to free margins a Double O-Z plasty (double transposition flap) was deemed most appropriate.  Using a sterile surgical marker, the appropriate transposition flaps were drawn incorporating the defect and placing the expected incisions within the relaxed skin tension lines where possible. The area thus outlined was incised deep to adipose tissue with a #15 scalpel blade.  The skin margins were undermined to an appropriate distance in all directions utilizing iris scissors.  Hemostasis was achieved with electrocautery.  The flaps were then transposed into place, one clockwise and the other counterclockwise, and anchored with interrupted buried subcutaneous sutures.
Surgeon/Pathologist Verbiage (Will Incorporate Name Of Surgeon From Intro If Not Blank): operated in two distinct and integrated capacities as the surgeon and pathologist.
Estimated Blood Loss (Cc): minimal
Post-Care Instructions: I reviewed with the patient in detail post-care instructions. Patient is not to engage in any heavy lifting, exercise, or swimming for the next 14 days. Should the patient develop any fevers, chills, bleeding, severe pain patient will contact the office immediately.
Graft Cartilage Fenestration Text: The cartilage was fenestrated with a 2mm punch biopsy to help facilitate graft survival and healing.
Where Do You Want The Question To Include Opioid Counseling Located?: Case Summary Tab
Tumor Debulked?: curette
Trilobed Flap Text: The defect edges were debeveled with a #15 scalpel blade.  Given the location of the defect and the proximity to free margins a trilobed flap was deemed most appropriate.  Using a sterile surgical marker, an appropriate trilobed flap drawn around the defect.    The area thus outlined was incised deep to adipose tissue with a #15 scalpel blade.  The skin margins were undermined to an appropriate distance in all directions utilizing iris scissors.
Repair Hemostasis (Optional): Pinpoint electrocautery
Undermining Location (Optional): in the superficial subcutaneous fat
Bcc Infiltrative Histology Text: There were numerous aggregates of basaloid cells demonstrating an infiltrative pattern in the dermis with atypical cell morphology.
Scc Poorly Differentiated Histology Text: There are nests and single cells of poorly-differentiated atypical squamous epithelial cells extending into the dermis with atypical cell morphology.
W Plasty Text: The lesion was extirpated to the level of the fat with a #15 scalpel blade.  Given the location of the defect, shape of the defect and the proximity to free margins a W-plasty was deemed most appropriate for repair.  Using a sterile surgical marker, the appropriate transposition arms of the W-plasty were drawn incorporating the defect and placing the expected incisions within the relaxed skin tension lines where possible.    The area thus outlined was incised deep to adipose tissue with a #15 scalpel blade.  The skin margins were undermined to an appropriate distance in all directions utilizing iris scissors.  The opposing transposition arms were then transposed into place in opposite direction and anchored with interrupted buried subcutaneous sutures.
Cheek Interpolation Flap Text: A decision was made to reconstruct the defect utilizing an interpolation axial flap and a staged reconstruction.  A telfa template was made of the defect.  This telfa template was then used to outline the Cheek Interpolation flap.  The donor area for the pedicle flap was then injected with anesthesia.  The flap was excised through the skin and subcutaneous tissue down to the layer of the underlying musculature.  The interpolation flap was carefully excised within this deep plane to maintain its blood supply.  The edges of the donor site were undermined.   The donor site was closed in a primary fashion.  The pedicle was then rotated into position and sutured.  Once the tube was sutured into place, adequate blood supply was confirmed with blanching and refill.  The pedicle was then wrapped with xeroform gauze and dressed appropriately with a telfa and gauze bandage to ensure continued blood supply and protect the attached pedicle.
Cheiloplasty (Complex) Text: A decision was made to reconstruct the defect with a  cheiloplasty.  The defect was undermined extensively.  Additional obicularis oris muscle was excised with a 15 blade scalpel.  The defect was converted into a full thickness wedge to facilite a better cosmetic result.  Small vessels were then tied off with 5-0 monocyrl. The obicularis oris, superficial fascia, adipose and dermis were then reapproximated.  After the deeper layers were approximated the epidermis was reapproximated with particular care given to realign the vermilion border.
Fibroepithelioma Of Pinkus Histology Text: There were numerous interconnected and branching strands of  basaloid cells extending from the epidermis into the dermis with atypical cell morphology.
Consent (Near Eyelid Margin)/Introductory Paragraph: The rationale for Mohs was explained to the patient and consent was obtained. The risks, benefits and alternatives to therapy were discussed in detail. Specifically, the risks of ectropion or eyelid deformity, infection, scarring, bleeding, prolonged wound healing, incomplete removal, allergy to anesthesia, nerve injury and recurrence were addressed. Prior to the procedure, the treatment site was clearly identified and confirmed by the patient. All components of Universal Protocol/PAUSE Rule completed.
Consent Type: Consent 1 (Standard)
Mauc Instructions: By selecting yes to the question below the MAUC number will be added into the note.  This will be calculated automatically based on the diagnosis chosen, the size entered, the body zone selected (H,M,L) and the specific indications you chose. You will also have the option to override the Mohs AUC if you disagree with the automatically calculated number and this option is found in the Case Summary tab.
Deep Sutures: 5-0 Maxon
Complex Repair And Flap Additional Text (Will Appearing After The Standard Complex Repair Text): The complex repair was not sufficient to completely close the primary defect. The remaining additional defect was repaired with the flap mentioned below.
A-T Advancement Flap Text: The defect edges were debeveled with a #15 scalpel blade.  Given the location of the defect, shape of the defect and the proximity to free margins an A-T advancement flap was deemed most appropriate.  Using a sterile surgical marker, an appropriate advancement flap was drawn incorporating the defect and placing the expected incisions within the relaxed skin tension lines where possible.    The area thus outlined was incised deep to adipose tissue with a #15 scalpel blade.  The skin margins were undermined to an appropriate distance in all directions utilizing iris scissors.
Detail Level: Detailed
Consent (Spinal Accessory)/Introductory Paragraph: The rationale for Mohs was explained to the patient and consent was obtained. The risks, benefits and alternatives to therapy were discussed in detail. Specifically, the risks of damage to the spinal accessory nerve, infection, scarring, bleeding, prolonged wound healing, incomplete removal, allergy to anesthesia, and recurrence were addressed. Prior to the procedure, the treatment site was clearly identified and confirmed by the patient. All components of Universal Protocol/PAUSE Rule completed.
Secondary Intention Text (Leave Blank If You Do Not Want): The defect will heal with secondary intention.
Brow Lift Text: A midfrontal incision was made medially to the defect to allow access to the tissues just superior to the left eyebrow. Following careful dissection inferiorly in a supraperiosteal plane to the level of the left eyebrow, several 3-0 monocryl sutures were used to resuspend the eyebrow orbicularis oculi muscular unit to the superior frontal bone periosteum. This resulted in an appropriate reapproximation of static eyebrow symmetry and correction of the left brow ptosis.
Island Pedicle Flap Text: The defect edges were debeveled with a #15 scalpel blade.  Given the location of the defect, shape of the defect and the proximity to free margins an island pedicle advancement flap was deemed most appropriate.  Using a sterile surgical marker, an appropriate advancement flap was drawn incorporating the defect, outlining the appropriate donor tissue and placing the expected incisions within the relaxed skin tension lines where possible.    The area thus outlined was incised deep to adipose tissue with a #15 scalpel blade.  The skin margins were undermined to an appropriate distance in all directions around the primary defect and laterally outward around the island pedicle utilizing iris scissors.  There was minimal undermining beneath the pedicle flap.
Area M Indication Text: Tumors in this location are included in Area M (cheek, forehead, scalp, neck, jawline and pretibial skin).  Mohs surgery is indicated for tumors in these anatomic locations.
Bcc Keratotic Histology Text: There were numerous aggregates of keratinizing basaloid cells in the dermis with atypical cell morphology.
Staging Info: By selecting yes to the question above you will include information on AJCC 8 tumor staging in your Mohs note. Information on tumor staging will be automatically added for SCCs on the head and neck. AJCC 8 includes tumor size, tumor depth, perineural involvement and bone invasion.
Incidental Actinic Keratosis Histology Text: In addition to the primary tumor, there were atypical keratinocytes in the epidermis with atypical cell morphology that did not extend the full thickness of the epidermis.
Consent 3/Introductory Paragraph: I gave the patient a chance to ask questions they had about the procedure.  Following this I explained the Mohs procedure and consent was obtained. The risks, benefits and alternatives to therapy were discussed in detail. Specifically, the risks of infection, scarring, bleeding, prolonged wound healing, incomplete removal, allergy to anesthesia, nerve injury and recurrence were addressed. Prior to the procedure, the treatment site was clearly identified and confirmed by the patient. All components of Universal Protocol/PAUSE Rule completed.
H Plasty Text: Given the location of the defect, shape of the defect and the proximity to free margins a H-plasty was deemed most appropriate for repair.  Using a sterile surgical marker, the appropriate advancement arms of the H-plasty were drawn incorporating the defect and placing the expected incisions within the relaxed skin tension lines where possible. The area thus outlined was incised deep to adipose tissue with a #15 scalpel blade. The skin margins were undermined to an appropriate distance in all directions utilizing iris scissors.  The opposing advancement arms were then advanced into place in opposite direction and anchored with interrupted buried subcutaneous sutures.
Pain Refusal Text: I offered to prescribe pain medication but the patient refused to take this medication.
Non-Graft Cartilage Fenestration Text: The cartilage was fenestrated with a 2mm punch biopsy to help facilitate healing.
Epidermal Sutures: 5-0 Fast Absorbing Gut
Dressing: pressure dressing with telfa
Burow's Graft Text: The defect edges were debeveled with a #15 scalpel blade.  Given the location of the defect, shape of the defect, the proximity to free margins and the presence of a standing cone deformity a Burow's skin graft was deemed most appropriate. The standing cone was removed and this tissue was then trimmed to the shape of the primary defect. The adipose tissue was also removed until only dermis and epidermis were left.  The skin margins of the secondary defect were undermined to an appropriate distance in all directions utilizing iris scissors.  The secondary defect was closed with interrupted buried subcutaneous sutures.  The skin edges were then re-apposed with running  sutures.  The skin graft was then placed in the primary defect and oriented appropriately.
Interpolation Flap Text: A decision was made to reconstruct the defect utilizing an interpolation axial flap and a staged reconstruction.  A telfa template was made of the defect.  This telfa template was then used to outline the interpolation flap.  The donor area for the pedicle flap was then injected with anesthesia.  The flap was excised through the skin and subcutaneous tissue down to the layer of the underlying musculature.  The interpolation flap was carefully excised within this deep plane to maintain its blood supply.  The edges of the donor site were undermined.   The donor site was closed in a primary fashion.  The pedicle was then rotated into position and sutured.  Once the tube was sutured into place, adequate blood supply was confirmed with blanching and refill.  The pedicle was then wrapped with xeroform gauze and dressed appropriately with a telfa and gauze bandage to ensure continued blood supply and protect the attached pedicle.
Scc Moderately Differentiated Histology Text: There are nests and single cells of moderately-differentiated atypical squamous epithelial cells extending into the dermis with atypical cell morphology.
Ear Star Wedge Flap Text: The defect edges were debeveled with a #15 blade scalpel.  Given the location of the defect and the proximity to free margins (helical rim) an ear star wedge flap was deemed most appropriate.  Using a sterile surgical marker, the appropriate flap was drawn incorporating the defect and placing the expected incisions between the helical rim and antihelix where possible.  The area thus outlined was incised through and through with a #15 scalpel blade.
Complex Requirements: Exposure Of Vital Structure?: Cartilage
Cartilage Graft Text: The defect edges were debeveled with a #15 scalpel blade.  Given the location of the defect, shape of the defect, the fact the defect involved a full thickness cartilage defect a cartilage graft was deemed most appropriate.  An appropriate donor site was identified, cleansed, and anesthetized. The cartilage graft was then harvested and transferred to the recipient site, oriented appropriately and then sutured into place.  The secondary defect was then repaired using a primary closure.
Incidental Squamous Cell Carcinoma In Situ Histology Text: In addition to the primary tumor, there were glassy pink keratinocytes in the epidermis with full thickness atypia and atypical cell morphology.
Cheiloplasty (Less Than 50%) Text: A decision was made to reconstruct the defect with a  cheiloplasty.  The defect was undermined extensively.  Additional obicularis oris muscle was excised with a 15 blade scalpel.  The defect was converted into a full thickness wedge, of less than 50% of the vertical height of the lip, to facilite a better cosmetic result.  Small vessels were then tied off with 5-0 monocyrl. The obicularis oris, superficial fascia, adipose and dermis were then reapproximated.  After the deeper layers were approximated the epidermis was reapproximated with particular care given to realign the vermilion border.
Scc In Situ Histology Text: There are glassy pink variably sized keratinocytes in the epidermis with full thickness atypia and atypical cell morphology.
Tarsorrhaphy Text: A tarsorrhaphy was performed using Frost sutures.
V-Y Flap Text: The defect edges were debeveled with a #15 scalpel blade.  Given the location of the defect, shape of the defect and the proximity to free margins a V-Y flap was deemed most appropriate.  Using a sterile surgical marker, an appropriate advancement flap was drawn incorporating the defect and placing the expected incisions within the relaxed skin tension lines where possible.    The area thus outlined was incised deep to adipose tissue with a #15 scalpel blade.  The skin margins were undermined to an appropriate distance in all directions utilizing iris scissors.
Incidental Superficial Basal Cell Carcinoma Histology Text: In addition to the primary tumor, there were aggregates of basaloid cells budding off of the epidermis with atypical cell morphology.
Double Island Pedicle Flap Text: The defect edges were debeveled with a #15 scalpel blade.  Given the location of the defect, shape of the defect and the proximity to free margins a double island pedicle advancement flap was deemed most appropriate.  Using a sterile surgical marker, an appropriate advancement flap was drawn incorporating the defect, outlining the appropriate donor tissue and placing the expected incisions within the relaxed skin tension lines where possible.    The area thus outlined was incised deep to adipose tissue with a #15 scalpel blade.  The skin margins were undermined to an appropriate distance in all directions around the primary defect and laterally outward around the island pedicle utilizing iris scissors.  There was minimal undermining beneath the pedicle flap.
Area H Indication Text: Tumors in this location are included in Area H (eyelids, eyebrows, nose, lips, chin, ear, pre-auricular, post-auricular, temple, genitalia, hands, feet, ankles and areola).  Tissue conservation is critical in these anatomic locations.
Transposition Flap Text: The defect edges were debeveled with a #15 scalpel blade.  Given the location of the defect and the proximity to free margins a transposition flap was deemed most appropriate.  Using a sterile surgical marker, an appropriate transposition flap was drawn incorporating the defect.    The area thus outlined was incised deep to adipose tissue with a #15 scalpel blade.  The skin margins were undermined to an appropriate distance in all directions utilizing iris scissors.
Bi-Rhombic Flap Text: The defect edges were debeveled with a #15 scalpel blade.  Given the location of the defect and the proximity to free margins a bi-rhombic flap was deemed most appropriate.  Using a sterile surgical marker, an appropriate rhombic flap was drawn incorporating the defect. The area thus outlined was incised deep to adipose tissue with a #15 scalpel blade.  The skin margins were undermined to an appropriate distance in all directions utilizing iris scissors.
Bilateral Helical Rim Advancement Flap Text: The defect edges were debeveled with a #15 blade scalpel.  Given the location of the defect and the proximity to free margins (helical rim) a bilateral helical rim advancement flap was deemed most appropriate.  Using a sterile surgical marker, the appropriate advancement flaps were drawn incorporating the defect and placing the expected incisions between the helical rim and antihelix where possible.  The area thus outlined was incised through and through with a #15 scalpel blade.  With a skin hook and iris scissors, the flaps were gently and sharply undermined and freed up.
Banner Transposition Flap Text: The defect edges were debeveled with a #15 scalpel blade.  Given the location of the defect and the proximity to free margins a Banner transposition flap was deemed most appropriate.  Using a sterile surgical marker, an appropriate flap drawn around the defect. The area thus outlined was incised deep to adipose tissue with a #15 scalpel blade.  The skin margins were undermined to an appropriate distance in all directions utilizing iris scissors.
Home Suture Removal Text: Patient was provided instructions on removing sutures and will remove their sutures at home.  If they have any questions or difficulties they will call the office.
Mastoid Interpolation Flap Text: A decision was made to reconstruct the defect utilizing an interpolation axial flap and a staged reconstruction.  A telfa template was made of the defect.  This telfa template was then used to outline the mastoid interpolation flap.  The donor area for the pedicle flap was then injected with anesthesia.  The flap was excised through the skin and subcutaneous tissue down to the layer of the underlying musculature.  The pedicle flap was carefully excised within this deep plane to maintain its blood supply.  The edges of the donor site were undermined.   The donor site was closed in a primary fashion.  The pedicle was then rotated into position and sutured.  Once the tube was sutured into place, adequate blood supply was confirmed with blanching and refill.  The pedicle was then wrapped with xeroform gauze and dressed appropriately with a telfa and gauze bandage to ensure continued blood supply and protect the attached pedicle.
Xenograft Text: The defect edges were debeveled with a #15 scalpel blade.  Given the location of the defect, shape of the defect and the proximity to free margins a xenograft was deemed most appropriate.  The graft was then trimmed to fit the size of the defect.  The graft was then placed in the primary defect and oriented appropriately.

## 2021-05-19 NOTE — PROGRESS NOTES
CARDIOLOGY OUTPATIENT FOLLOWUP    PCP: Afua Solorio M.D.    1. Coronary artery disease involving native coronary artery of native heart without angina pectoris    2. Essential hypertension    3. Dyslipidemia    4. Obstructive sleep apnea syndrome    5. White coat syndrome with diagnosis of hypertension      Kyle Fleming is having bothersome, migratory arthralgias and edema.  Although atypical for statin reaction, he does report prior improvement in symptoms with interruption of therapy.  I therefore recommended a trial of 2 weeks on, 2 weeks off to assess symptom response.  He will keep a diary of symptoms and report back with his progress.  Ultimately the goal will be to resume statin therapy.  Risk factors are otherwise appropriately controlled.    Follow up: 3 months    Chief Complaint   Patient presents with   • Coronary Artery Disease     F/V Dx: Coronary artery disease involving native coronary artery of native heart without angina pectoris   • Hypertension     F/V Dx: Essential hypertension   • Hyperlipidemia     F/V Dx: Other hyperlipidemia       History: Kyle Fleming is a 81 y.o. male past medical history of PCI in 2018 as well as hypertension hyperlipidemia presenting for follow-up.  He also has sleep apnea and was recently started on CPAP which has made a dramatic improvement in his overall wellbeing.  His biggest issue these days is surrounding migratory arthralgias.  He experiences episodes of tightening in the neck spreading across his upper body and settling into the shoulders and then ultimately into the lower extremities as well.  At times he gets marked swelling and describes instances with the left hand being swollen as well as the bilateral knees.  He did complete a submaximal stress echocardiogram which was limited by arthritis and showed no evidence of ischemia. testing.  He describes similar sensations last fall and resolution of the symptoms seem to coincide with interruption of  "statin therapy which was recommended for transaminase elevation.    He was recently started on Celebrex which seems to have slightly improved symptoms      ROS:   All other systems reviewed and negative except as per the HPI    PE:  /90 (BP Location: Left arm, Patient Position: Sitting, BP Cuff Size: Adult)   Pulse 69   Resp 12   Ht 1.753 m (5' 9\")   Wt 114 kg (252 lb 4.8 oz)   SpO2 97%   BMI 37.26 kg/m²   Gen: no acute distress  HEENT: Symmetric face. Anicteric sclerae. Moist mucus membranes  NECK: No JVD. No lymphadenopathy  CARDIAC: Normal PMI, Normal S1, S2, No murmur  VASCULATURE: carotids are normal bilaterally without bruit  RESP: Clear to auscultation bilaterally  ABD: Soft, non-tender, non-distended  EXT: No edema, no clubbing or cyanosis  SKIN: Warm and dry  NEURO: No gross deficits  PSYCH: Appropriate affect, participates in conversation    The 10-year ASCVD risk score (Pedrocourtney DELA CRUZ Jr., et al., 2013) is: 35.4%    Past Medical History:   Diagnosis Date   • Hyperlipidemia    • Hypertension      Allergies   Allergen Reactions   • Ciprofloxacin Hcl Rash     rash     Outpatient Encounter Medications as of 5/19/2021   Medication Sig Dispense Refill   • lisinopril (PRINIVIL) 5 MG Tab Take 1 tablet by mouth every day. TAKE 1 TABLET BY MOUTH DAILY 90 tablet 3   • atorvastatin (LIPITOR) 80 MG tablet Take 1 tablet by mouth every day. 90 tablet 3   • Magnesium 250 MG Tab Take  by mouth every day.     • Fexofenadine HCl (ALLERGY 24-HR PO) Take  by mouth every day.     • Multiple Vitamins-Minerals (OCUVITE-LUTEIN) Tab Take 1 tablet by mouth every day.     • Cholecalciferol (VITAMIN D3) 3000 units Tab Take  by mouth.     • aspirin 81 MG EC tablet Take 1 Tab by mouth every day. 90 Tab 3   • ezetimibe (ZETIA) 10 MG Tab Take 1 tablet by mouth every day. 90 tablet 3   • [DISCONTINUED] ascorbic acid (ASCORBIC ACID) 500 MG Tab Take 500 mg by mouth every day. (Patient not taking: Reported on 5/19/2021)       No " facility-administered encounter medications on file as of 2021.     Social History     Socioeconomic History   • Marital status:      Spouse name: Not on file   • Number of children: Not on file   • Years of education: Not on file   • Highest education level: Not on file   Occupational History   • Not on file   Tobacco Use   • Smoking status: Former Smoker     Packs/day: 0.25     Years: 20.00     Pack years: 5.00     Types: Cigarettes     Quit date:      Years since quittin.4   • Smokeless tobacco: Never Used   Vaping Use   • Vaping Use: Never used   Substance and Sexual Activity   • Alcohol use: Yes     Alcohol/week: 0.6 oz     Types: 1 Glasses of wine per week     Comment: wine   • Drug use: Never   • Sexual activity: Not Currently     Comment: , retired    Other Topics Concern   • Not on file   Social History Narrative   • Not on file     Social Determinants of Health     Financial Resource Strain: Low Risk    • Difficulty of Paying Living Expenses: Not hard at all   Food Insecurity: No Food Insecurity   • Worried About Running Out of Food in the Last Year: Never true   • Ran Out of Food in the Last Year: Never true   Transportation Needs: No Transportation Needs   • Lack of Transportation (Medical): No   • Lack of Transportation (Non-Medical): No   Physical Activity: Insufficiently Active   • Days of Exercise per Week: 5 days   • Minutes of Exercise per Session: 10 min   Stress: No Stress Concern Present   • Feeling of Stress : Not at all   Social Connections: Moderately Isolated   • Frequency of Communication with Friends and Family: More than three times a week   • Frequency of Social Gatherings with Friends and Family: Never   • Attends [employer] Services: Never   • Active Member of Clubs or Organizations: No   • Attends Club or Organization Meetings: Never   • Marital Status:    Intimate Partner Violence:    • Fear of Current or Ex-Partner:    •  Emotionally Abused:    • Physically Abused:    • Sexually Abused:        Studies  Lab Results   Component Value Date/Time    CHOLSTRLTOT 159 02/19/2021 08:09 AM    LDL 76 02/19/2021 08:09 AM    HDL 51 02/19/2021 08:09 AM    TRIGLYCERIDE 158 (H) 02/19/2021 08:09 AM       Lab Results   Component Value Date/Time    SODIUM 143 12/17/2020 07:09 AM    POTASSIUM 4.4 12/17/2020 07:09 AM    CHLORIDE 110 12/17/2020 07:09 AM    CO2 23 12/17/2020 07:09 AM    GLUCOSE 119 (H) 12/17/2020 07:09 AM    BUN 18 12/17/2020 07:09 AM    CREATININE 0.73 12/17/2020 07:09 AM     Lab Results   Component Value Date/Time    ALKPHOSPHAT 62 12/17/2020 07:09 AM    ASTSGOT 29 12/17/2020 07:09 AM    ALTSGPT 28 12/17/2020 07:09 AM    TBILIRUBIN 0.7 12/17/2020 07:09 AM        For this encounter I reviewed the following medical records PCP notes, BMP, Lipid profile and CBC   For this encounter I directly reviewed ECG tracings. I agree with the interpretations in the electronic health record.

## 2021-06-10 PROBLEM — G47.62 NOCTURNAL LEG CRAMPS: Status: ACTIVE | Noted: 2021-01-01

## 2021-06-10 PROBLEM — D69.6 THROMBOCYTOPENIA (HCC): Status: ACTIVE | Noted: 2021-01-01

## 2021-06-10 NOTE — ASSESSMENT & PLAN NOTE
Elevated liver enzymes  Denies excessive ETOH  Used to take ibuprofen for pain, now switched to celebrex  Denies herbal supplement     Ref. Range 6/7/2021 07:29   AST(SGOT) Latest Ref Range: 12 - 45 U/L 94 (H)   ALT(SGPT) Latest Ref Range: 2 - 50 U/L 68 (H)   Alkaline Phosphatase Latest Ref Range: 30 - 99 U/L 111 (H)   Total Bilirubin Latest Ref Range: 0.1 - 1.5 mg/dL 1.1   Albumin Latest Ref Range: 3.2 - 4.9 g/dL 3.7

## 2021-06-10 NOTE — ASSESSMENT & PLAN NOTE
Nocturnal left leg cramps    ?Walking or leg jiggling followed by leg elevation  ?A hot shower with the stream directed at the cramp area of the body, usually for five minutes, or a warm tub bath  ?Ice massage    •In generally sedentary patients, riding a stationary bicycle for a few minutes before retiring.  •Using long-countered shoes and other proper foot gear.   •Keeping the bed covers at the foot of the bed loose and not tucked in.  •Patients may benefit from avoiding dehydration, particularly older adults and patients on diuretics; avoidance of drugs known to cause cramping and of alcohol and caffeine; and should avoid exercising in extreme heat.      Vitamin B complex  Stretching exercise handout provided

## 2021-06-10 NOTE — PATIENT INSTRUCTIONS
?Walking or leg jiggling followed by leg elevation  ?A hot shower with the stream directed at the cramp area of the body, usually for five minutes, or a warm tub bath  ?Ice massage    •In generally sedentary patients, riding a stationary bicycle for a few minutes before retiring.  •Using long-countered shoes and other proper foot gear.   •Keeping the bed covers at the foot of the bed loose and not tucked in.  •Patients may benefit from avoiding dehydration, particularly older adults and patients on diuretics; avoidance of drugs known to cause cramping and of alcohol and caffeine; and should avoid exercising in extreme heat.       vitamin B complex

## 2021-06-10 NOTE — PROGRESS NOTES
Established Patient    Kyle Fleming is a 81 y.o. male who presents today with the following:    CC:   Chief Complaint   Patient presents with   • Follow-Up     Ortho   • Lab Results   • Immunizations     TDAP   • Leg Cramps     NIGHTLY       HPI:     Thrombocytopenia (HCC)  Intermittent bruises on left arm  No overt bleeding   Ref. Range 2/26/2020 07:15 10/20/2020 11:16 5/25/2021 12:00 6/7/2021 07:29   WBC Latest Ref Range: 4.8 - 10.8 K/uL 5.3 5.4 6.2 5.3   RBC Latest Ref Range: 4.70 - 6.10 M/uL 4.46 (L) 4.80 4.43 (L) 4.57 (L)   Hemoglobin Latest Ref Range: 14.0 - 18.0 g/dL 14.1 15.1 14.6 15.3   Hematocrit Latest Ref Range: 42.0 - 52.0 % 42.3 45.4 44.8 47.5   MCV Latest Ref Range: 81.4 - 97.8 fL 94.8 94.6 101.1 (H) 103.9 (H)   MCH Latest Ref Range: 27.0 - 33.0 pg 31.6 31.5 33.0 33.5 (H)   MCHC Latest Ref Range: 33.7 - 35.3 g/dL 33.3 (L) 33.3 (L) 32.6 (L) 32.2 (L)   RDW Latest Ref Range: 35.9 - 50.0 fL 43.7 55.8 (H) 55.0 (H) 55.2 (H)   Platelet Count Latest Ref Range: 164 - 446 K/uL 177 153 (L) 134 (L) 124 (L)   MPV Latest Ref Range: 9.0 - 12.9 fL 12.5 13.0 (H) 13.0 (H) 12.9   Neutrophils-Polys Latest Ref Range: 44.00 - 72.00 % 56.80 55.30 61.20 63.00   Neutrophils (Absolute) Latest Ref Range: 1.82 - 7.42 K/uL 3.02 3.00 3.82 3.34   Lymphocytes Latest Ref Range: 22.00 - 41.00 % 28.40 27.60 23.60 22.50   Lymphs (Absolute) Latest Ref Range: 1.00 - 4.80 K/uL 1.51 1.50 1.47 1.19   Monocytes Latest Ref Range: 0.00 - 13.40 % 10.20 13.40 11.10 10.00   Monos (Absolute) Latest Ref Range: 0.00 - 0.85 K/uL 0.54 0.73 0.69 0.53   Eosinophils Latest Ref Range: 0.00 - 6.90 % 3.60 2.40 3.20 3.40   Eos (Absolute) Latest Ref Range: 0.00 - 0.51 K/uL 0.19 0.13 0.20 0.18   Basophils Latest Ref Range: 0.00 - 1.80 % 0.60 0.70 0.60 0.90   Baso (Absolute) Latest Ref Range: 0.00 - 0.12 K/uL 0.03 0.04 0.04 0.05   Immature Granulocytes Latest Ref Range: 0.00 - 0.90 % 0.40 0.60 0.30 0.20   Immature Granulocytes (abs) Latest Ref Range: 0.00 -  0.11 K/uL 0.02 0.03 0.02 0.01   Nucleated RBC Latest Units: /100 WBC 0.00 0.00 0.00 0.00   NRBC (Absolute) Latest Units: K/uL 0.00 0.00 0.00 0.00         Other hyperlipidemia  Controlled on atorvastatin 80 mg daily  Lifestyle modification      Essential hypertension  Stable on lisinopril      Coronary artery disease involving native coronary artery of native heart without angina pectoris  CAD s/p JOSSELINE (Everolimus eluting) in March 2018. On Aspirin, statin,  lisinopril.   Following with [facility]      Abnormal results of liver function studies  Elevated liver enzymes  Denies excessive ETOH  Used to take ibuprofen for pain, now switched to celebrex  Denies herbal supplement     Ref. Range 6/7/2021 07:29   AST(SGOT) Latest Ref Range: 12 - 45 U/L 94 (H)   ALT(SGPT) Latest Ref Range: 2 - 50 U/L 68 (H)   Alkaline Phosphatase Latest Ref Range: 30 - 99 U/L 111 (H)   Total Bilirubin Latest Ref Range: 0.1 - 1.5 mg/dL 1.1   Albumin Latest Ref Range: 3.2 - 4.9 g/dL 3.7         Nocturnal leg cramps  Nocturnal left leg cramps    ?Walking or leg jiggling followed by leg elevation  ?A hot shower with the stream directed at the cramp area of the body, usually for five minutes, or a warm tub bath  ?Ice massage    •In generally sedentary patients, riding a stationary bicycle for a few minutes before retiring.  •Using long-countered shoes and other proper foot gear.   •Keeping the bed covers at the foot of the bed loose and not tucked in.  •Patients may benefit from avoiding dehydration, particularly older adults and patients on diuretics; avoidance of drugs known to cause cramping and of alcohol and caffeine; and should avoid exercising in extreme heat.      Vitamin B complex  Stretching exercise handout provided            Current Outpatient Medications   Medication Sig Dispense Refill   • celecoxib (CELEBREX) 100 MG Cap Take 1 capsule by mouth 2 times a day. TAKE WITH FOOD     • Ascorbic Acid (VITAMIN C) 500 MG Cap Take 1  "tablet by mouth every day.     • lisinopril (PRINIVIL) 5 MG Tab Take 1 tablet by mouth every day. TAKE 1 TABLET BY MOUTH DAILY 90 tablet 3   • atorvastatin (LIPITOR) 80 MG tablet Take 1 tablet by mouth every day. 90 tablet 3   • Magnesium 250 MG Tab Take  by mouth every day.     • Fexofenadine HCl (ALLERGY 24-HR PO) Take  by mouth every day.     • Multiple Vitamins-Minerals (OCUVITE-LUTEIN) Tab Take 1 tablet by mouth every day.     • Cholecalciferol (VITAMIN D3) 125 MCG (5000 UT) Tab Take  by mouth.     • aspirin 81 MG EC tablet Take 1 Tab by mouth every day. 90 Tab 3     No current facility-administered medications for this visit.       Allergies, past medical history, past surgical history, medications, family history, social history reviewed and updated.    ROS   Constitutional: Denies fevers or chills  Eyes: Denies changes in vision  Ears/Nose/Throat/Mouth: Denies nasal congestion or sore throat   Cardiovascular: Denies chest pain or palpitations   Respiratory: Denies shortness of breath , Denies cough  Gastrointestinal/Hepatic: Denies abd pain, nausea, vomiting   Genitourinary: Denies dysuria or frequency  Musculoskeletal/Rheum: Denies joint pain and swelling   Neurological: Denies headache  Psychiatric: Denies mood disorder   Endocrine: Denies hx of diabetes or thyroid dysfunction  Heme/Oncology/Lymph Nodes: Denies weight changes or enlarged LNs.    Physical Exam  Vitals: /84 (BP Location: Left arm, Patient Position: Sitting, BP Cuff Size: Adult)   Pulse 75   Temp 36.4 °C (97.6 °F) (Temporal)   Resp 18   Ht 1.753 m (5' 9\")   Wt 111 kg (244 lb 11.4 oz)   SpO2 97%   BMI 36.14 kg/m²   General: Alert, pleasant, NAD  HEENT: Normocephalic.  EOMI, no icterus or pallor.  Conjunctivae and lids normal. External ears normal. Wearing a mask. Oropharynx non-erythematous, mucous membranes moist.  Neck supple.  No thyromegaly or masses palpated.   Lymph: No cervical or supraclavicular " lymphadenopathy.  Cardiovascular: Regular rate and rhythm.   Respiratory: Normal respiratory effort.  Clear to auscultation bilaterally.  Abdomen: Non-distended, soft, non-tender  Skin: Warm, dry, no rashes.  Musculoskeletal: Gait is normal.  Moves all extremities well.  Extremities: No leg edema.    Psych:  Affect/mood is normal, judgement is good, memory is at baseline, grooming is appropriate.      Assessment and Plan    1. Essential hypertension  Stable on lisinopril    2. Abnormal results of liver function studies  - US-RUQ; Future  - IRON/TOTAL IRON BIND; Future  - FERRITIN; Future  - HEP B SURFACE AB; Future  - HEP B SURFACE ANTIGEN; Future  - HEP BE ANTIBODY; Future  - HEP BE ANTIGEN; Future  - HEP C VIRUS ANTIBODY; Future  - HEPATITIS B CORE AB W/REFLEX  - IGG SERUM QUANT; Future  - IGA SERUM QUANT; Future  - MITOCHONDRIAL (M2) AB; Future  - T-TRANSGLUTAMINASE (TTG) IGA; Future  - GAMMA GT (GGT); Future  - ANTI-SMOOTH MUSCLE ABS; Future  - ALKALINE PHOSPHATASE ISOENZYMES; Future    3. Macrocytosis without anemia  - VITAMIN B12; Future  - FOLATE; Future  - METHYLMALONIC ACID, SERUM; Future  - REFERRAL TO HEMATOLOGY ONCOLOGY    4. Thrombocytopenia (HCC)  - REFERRAL TO HEMATOLOGY ONCOLOGY    5. Other hyperlipidemia  On statin    6. Coronary artery disease involving native coronary artery of native heart without angina pectoris  CAD s/p JOSSELINE (Everolimus eluting) in March 2018. On Aspirin, statin,  lisinopril.   Following with [facility]    7. Need for vaccination  - Tdap Vaccine =>8YO IM    8. Nocturnal leg cramps  Nocturnal left leg cramps    ?Walking or leg jiggling followed by leg elevation  ?A hot shower with the stream directed at the cramp area of the body, usually for five minutes, or a warm tub bath  ?Ice massage    •In generally sedentary patients, riding a stationary bicycle for a few minutes before retiring.  •Using long-countered shoes and other proper foot gear.   •Keeping the bed covers at  the foot of the bed loose and not tucked in.  •Patients may benefit from avoiding dehydration, particularly older adults and patients on diuretics; avoidance of drugs known to cause cramping and of alcohol and caffeine; and should avoid exercising in extreme heat.      Vitamin B complex  Stretching exercise handout provided        Follow-up:Return in about 3 months (around 9/10/2021), or if symptoms worsen or fail to improve.    This note was created using voice recognition software. There may be unintended errors in spelling, grammar or content.

## 2021-06-10 NOTE — ASSESSMENT & PLAN NOTE
CAD s/p JOSSELINE (Everolimus eluting) in March 2018. On Aspirin, statin,  lisinopril.   Following with [facility]

## 2021-07-01 ENCOUNTER — APPOINTMENT (RX ONLY)
Dept: URBAN - METROPOLITAN AREA CLINIC 31 | Facility: CLINIC | Age: 81
Setting detail: DERMATOLOGY
End: 2021-07-01

## 2021-07-01 DIAGNOSIS — Z48.817 ENCOUNTER FOR SURGICAL AFTERCARE FOLLOWING SURGERY ON THE SKIN AND SUBCUTANEOUS TISSUE: ICD-10-CM

## 2021-07-01 PROCEDURE — ? POST-OP WOUND CHECK

## 2021-07-01 ASSESSMENT — LOCATION DETAILED DESCRIPTION DERM: LOCATION DETAILED: LEFT SUPERIOR HELIX

## 2021-07-01 ASSESSMENT — LOCATION ZONE DERM: LOCATION ZONE: EAR

## 2021-07-01 ASSESSMENT — LOCATION SIMPLE DESCRIPTION DERM: LOCATION SIMPLE: LEFT EAR

## 2021-07-01 NOTE — PROCEDURE: POST-OP WOUND CHECK
Wound Evaluated By: Shawna Dunn MD
Additional Comments: Patient with ecchymosis on the superior helix. Non-tender to palpation. No evidence of spitting suture. Advised time and massage. If not gone in 4 weeks, advised patient to call.
Detail Level: Detailed
Add 43020 Cpt? (Important Note: In 2017 The Use Of 41461 Is Being Tracked By Cms To Determine Future Global Period Reimbursement For Global Periods): no

## 2021-07-23 NOTE — PROGRESS NOTES
07/27/21    Subjective    Chief Complaint:  Macrocytosis and thrombocytopenia    HPI:  81 male referred by Dr. Solorio for progressive macrocytosis and thrombocytopenia for about the past year and a half. B12 is normal. He does have an echogenic liver on US although spleen is not enlarged. Years ago worked in [employer]. Did drink a fair amount of wine until 2 months ago when noted to have elevated LFTs. Markedly abnormal LFTs in October 2020. Still abnormal but less so. Feels well. Only c/o's poor hearing, easy bruising and joint pains.     ROS:    Constitutional: No weight loss  Skin: No rash or jaundice  HENT: No change in eyesight or decreased hearing  Cardiovascular:No chest pain or arrythmia  Respiratory:No cough or SOB  GI:No nausea, vomiting, diarrhea, constipation  :No dysuria or frequency  Musculoskeletal:Fairly new onset multiple joint pains  Neuro:No sx's of neuropathy  Psych: No complaints    PMH:      Allergies   Allergen Reactions   • Ciprofloxacin Hcl Rash     rash       Past Medical History:   Diagnosis Date   • Hyperlipidemia    • Hypertension         Past Surgical History:   Procedure Laterality Date   • EYE SURGERY Left 2019    left glaucoma and cataract surgery 10/2019   • OTHER  2018    left shoulder rotator cuff repair   • MULTIPLE CORONARY ARTERY BYPASS  2018   • LAMINOTOMY  2011    laminectomy, fixation  hardware. MRI compatible        Medications:    Current Outpatient Medications on File Prior to Visit   Medication Sig Dispense Refill   • celecoxib (CELEBREX) 100 MG Cap Take 1 capsule by mouth 2 times a day. TAKE WITH FOOD     • Ascorbic Acid (VITAMIN C) 500 MG Cap Take 1 tablet by mouth every day.     • lisinopril (PRINIVIL) 5 MG Tab Take 1 tablet by mouth every day. TAKE 1 TABLET BY MOUTH DAILY 90 tablet 3   • atorvastatin (LIPITOR) 80 MG tablet Take 1 tablet by mouth every day. 90 tablet 3   • Magnesium 250 MG Tab Take  by mouth every day.     • Fexofenadine HCl (ALLERGY 24-HR PO) Take   "by mouth every day.     • Multiple Vitamins-Minerals (OCUVITE-LUTEIN) Tab Take 1 tablet by mouth every day.     • Cholecalciferol (VITAMIN D3) 125 MCG (5000 UT) Tab Take  by mouth.     • aspirin 81 MG EC tablet Take 1 Tab by mouth every day. 90 Tab 3     No current facility-administered medications on file prior to visit.       Social History     Tobacco Use   • Smoking status: Former Smoker     Packs/day: 0.25     Years: 20.00     Pack years: 5.00     Types: Cigarettes     Quit date:      Years since quittin.5   • Smokeless tobacco: Never Used   Substance Use Topics   • Alcohol use: Not Currently     Comment: Quit drinking  2021        Family History   Problem Relation Age of Onset   • Other Mother         dementia   • Alcohol abuse Father    • Other Brother         dementia   • Other Sister         Alzheimer's   • Other Sister         Alzheimer's   • Cancer Brother         lung   • Cancer Brother         lung   • Cancer Brother         lung        Objective    Vitals:    /82 (BP Location: Right arm, Patient Position: Sitting, BP Cuff Size: Adult)   Pulse 67   Temp 36.6 °C (97.9 °F) (Temporal)   Resp 16   Ht 1.753 m (5' 9.02\")   Wt 111 kg (244 lb 14.9 oz)   SpO2 95%   BMI 36.15 kg/m²     Physical Exam:    Appears well-developed and well-nourished. No distress.    Head -  Normocephalic .   Eyes - Pupils are equal. Conjunctivae  normal. No scleral icterus.   Ears - Decreased hearing  Neck - Normal range of motion. Neck supple. No thyromegaly  Cardiovascular - Normal rate, regular rhythm, normal heart sounds and intact distal pulses. No  gallop, murmur or rub  Pulmonary - Normal breath sounds.  No wheeze, rales or rhonci  Abdominal -Soft. No distension, tenderness, organomegaly or mass  Extremities-  No edema or tenderness.    Nodes - No submental, submandibular, preauricular, cervical, axillary or inguinal adenopathy.    Neurological -   Alert and oriented.  Skin - Skin is warm and " dry.Some senile purpura.  No rash noted. Not diaphoretic. No erythema. No pallor. No jaundice   Psychiatric -  Normal mood and affect.    Labs:  Results for ARMANDO LOWE (MRN [id number])    Ref. Range 2/26/2020 07:15 10/20/2020 11:16 5/25/2021 12:00 6/7/2021 07:29   WBC Latest Ref Range: 4.8 - 10.8 K/uL 5.3 5.4 6.2 5.3   RBC Latest Ref Range: 4.70 - 6.10 M/uL 4.46 (L) 4.80 4.43 (L) 4.57 (L)   Hemoglobin Latest Ref Range: 14.0 - 18.0 g/dL 14.1 15.1 14.6 15.3   Hematocrit Latest Ref Range: 42.0 - 52.0 % 42.3 45.4 44.8 47.5   MCV Latest Ref Range: 81.4 - 97.8 fL 94.8 94.6 101.1 (H) 103.9 (H)   MCH Latest Ref Range: 27.0 - 33.0 pg 31.6 31.5 33.0 33.5 (H)   MCHC Latest Ref Range: 33.7 - 35.3 g/dL 33.3 (L) 33.3 (L) 32.6 (L) 32.2 (L)   RDW Latest Ref Range: 35.9 - 50.0 fL 43.7 55.8 (H) 55.0 (H) 55.2 (H)   Platelet Count Latest Ref Range: 164 - 446 K/uL 177 153 (L) 134 (L) 124 (L)   MPV Latest Ref Range: 9.0 - 12.9 fL 12.5 13.0 (H) 13.0 (H) 12.9   Neutrophils-Polys Latest Ref Range: 44.00 - 72.00 % 56.80 55.30 61.20 63.00   Neutrophils (Absolute) Latest Ref Range: 1.82 - 7.42 K/uL 3.02 3.00 3.82 3.34   Lymphocytes Latest Ref Range: 22.00 - 41.00 % 28.40 27.60 23.60 22.50   Lymphs (Absolute) Latest Ref Range: 1.00 - 4.80 K/uL 1.51 1.50 1.47 1.19   Monocytes Latest Ref Range: 0.00 - 13.40 % 10.20 13.40 11.10 10.00       Assessment    Imp:    Visit Diagnosis:    1. Macrocytosis  CBC WITH DIFFERENTIAL    LDH    RETICULOCYTES COUNT    Monoclonal Protein and FLC, Serum    HAPTOGLOBIN   2. Thrombocytopenia (HCC)           Plan:  Above lab  RTC 2 weeks      Kevin Dorsey M.D.

## 2021-07-30 PROBLEM — K82.4 GALLBLADDER POLYP: Status: ACTIVE | Noted: 2021-01-01

## 2021-07-30 PROBLEM — R74.8 ELEVATED SERUM GGT LEVEL: Status: ACTIVE | Noted: 2021-01-01

## 2021-07-30 NOTE — PROGRESS NOTES
Established Patient    Kyle Fleming is a 81 y.o. male who presents today with the following:    CC:   Chief Complaint   Patient presents with   • Follow-Up   • Lab Results       HPI:     Abnormal results of liver function studies  Elevated liver enzymes, elevated GGT, F actin Ab positive.  Hepatitis B and C panel negative. Anti smooth muscle, anti mitochondrial Ab negative  Iron panel unremarkable  Denies excessive ETOH  Used to take ibuprofen for pain, now switched to celebrex  Denies herbal supplement    7/30/21 US liver   1. Echogenic heterogeneous liver, likely hepatocellular disease.  2. Cholelithiasis.  3. Subcentimeter gallbladder polyp (3mm per radiology)        Ref. Range 6/7/2021 07:29   AST(SGOT) Latest Ref Range: 12 - 45 U/L 94 (H)   ALT(SGPT) Latest Ref Range: 2 - 50 U/L 68 (H)   Alkaline Phosphatase Latest Ref Range: 30 - 99 U/L 111 (H)   Total Bilirubin Latest Ref Range: 0.1 - 1.5 mg/dL 1.1   Albumin Latest Ref Range: 3.2 - 4.9 g/dL 3.7      Ref. Range 6/22/2021 07:10   Anti-Mitochondrial Ab Latest Ref Range: 0.0 - 24.9 Units 1.5   Smooth Muscle Abs Latest Ref Range: <1:20  <1:20   F-Actin Ab, IgG Latest Ref Range: 0 - 19 Units 33 (H)      Ref. Range 6/22/2021 07:10   Gamma Gt Latest Ref Range: 7 - 51 U/L 211 (H)   Iron Latest Ref Range: 50 - 180 ug/dL 142   Total Iron Binding Latest Ref Range: 250 - 450 ug/dL 326   % Saturation Latest Ref Range: 15 - 55 % 44   Unsat Iron Binding Latest Ref Range: 110 - 370 ug/dL 184   Alkaline Phosphatase Latest Ref Range: 40 - 120 U/L 102   Bone Fractions Latest Ref Range: 0 - 55 U/L 44   Liver Fractions Latest Ref Range: 0 - 94 U/L 58   Alk Phos Other Calc Latest Units: U/L 0   Ferritin Latest Ref Range: 22.0 - 322.0 ng/mL 170.0   Folate -Folic Acid Latest Ref Range: >4.0 ng/mL 11.1   Immunoglobulin A Latest Ref Range: 68.0 - 408.0 mg/dL 437.0 (H)   Immunoglobulin G Latest Ref Range: 768.0 - 1632.0 mg/dL 1495.0   t-TG IgA Latest Ref Range: 0 - 3 U/mL 2   Vitamin  B12 -True Cobalamin Latest Ref Range: 211 - 911 pg/mL 911   Hep B Surface Antibody Quant Latest Ref Range: 0.00 - 10.00 mIU/mL <3.50   Hepatitis B Surface Antigen Latest Ref Range: Non-Reactive  Non-Reactive   Hepatitis B Cors Ab,IgM Latest Ref Range: Non-Reactive  Non-Reactive   Hepatitis Be Antigen Latest Ref Range: Negative  Negative   Hepatitis BE Antibody Latest Ref Range: Negative  Negative   Hepatitis C Antibody Latest Ref Range: Non-Reactive  Non-Reactive         Thrombocytopenia (HCC)  Intermittent bruises on left arm  No overt bleeding  Following hematology        Current Outpatient Medications   Medication Sig Dispense Refill   • B Complex-Folic Acid (B COMPLEX-VITAMIN B12 PO)      • celecoxib (CELEBREX) 100 MG Cap Take 1 capsule by mouth 2 times a day. TAKE WITH FOOD     • Ascorbic Acid (VITAMIN C) 500 MG Cap Take 1 tablet by mouth every day.     • lisinopril (PRINIVIL) 5 MG Tab Take 1 tablet by mouth every day. TAKE 1 TABLET BY MOUTH DAILY 90 tablet 3   • atorvastatin (LIPITOR) 80 MG tablet Take 1 tablet by mouth every day. 90 tablet 3   • Magnesium 250 MG Tab Take  by mouth every day.     • Fexofenadine HCl (ALLERGY 24-HR PO) Take  by mouth every day.     • Multiple Vitamins-Minerals (OCUVITE-LUTEIN) Tab Take 1 tablet by mouth every day.     • Cholecalciferol (VITAMIN D3) 125 MCG (5000 UT) Tab Take  by mouth.     • aspirin 81 MG EC tablet Take 1 Tab by mouth every day. 90 Tab 3     No current facility-administered medications for this visit.       Allergies, past medical history, past surgical history, medications, family history, social history reviewed and updated.    ROS   Constitutional: Denies fevers or chills  Eyes: Denies changes in vision  Ears/Nose/Throat/Mouth: Denies nasal congestion or sore throat   Cardiovascular: Denies chest pain or palpitations   Respiratory: Denies shortness of breath , Denies cough  Gastrointestinal/Hepatic: Denies abd pain, nausea, vomiting   Genitourinary: Denies  "dysuria or frequency  Musculoskeletal/Rheum: Denies joint pain and swelling   Neurological: Denies headache  Psychiatric: mood stable  Endocrine: Denies hx of diabetes or thyroid dysfunction  Heme/Oncology/Lymph Nodes: Denies weight changes or enlarged LNs.    Physical Exam  Vitals: /84 (BP Location: Left arm, Patient Position: Sitting, BP Cuff Size: Adult)   Pulse 65   Temp 36.3 °C (97.3 °F) (Temporal)   Resp 14   Ht 1.753 m (5' 9\")   Wt 110 kg (242 lb 8.1 oz)   SpO2 96%   BMI 35.81 kg/m²   General: Alert, pleasant, NAD  HEENT: Normocephalic.  EOMI, no icterus or pallor.  Conjunctivae and lids normal. External ears normal. Wearing a mask. Oropharynx non-erythematous, mucous membranes moist.  Neck supple.  No thyromegaly or masses palpated.   Lymph: No cervical or supraclavicular lymphadenopathy.  Cardiovascular: Regular rate and rhythm.    Respiratory: Normal respiratory effort.  Clear to auscultation bilaterally.  Abdomen: Non-distended, soft, non-tender, Calvin sign negative  Skin: Warm, dry  Musculoskeletal: Gait is normal.  Moves all extremities well.  Extremities: No leg edema.  Radial pulses 2+ symmetric.   Psych:  Affect/mood is normal, judgement is good, memory is intact, grooming is appropriate.        Assessment and Plan    1. Abnormal results of liver function studies  Elevated liver enzymes, elevated GGT, F actin Ab positive.  Hepatitis B and C panel negative. Anti smooth muscle, anti mitochondrial Ab negative  Iron panel unremarkable  Denies excessive ETOH  Used to take ibuprofen for pain, now switched to celebrex  Denies herbal supplement  - REFERRAL TO GASTROENTEROLOGY  - HEPATIC FUNCTION PANEL; Future    2. Elevated serum GGT level  - REFERRAL TO GASTROENTEROLOGY  - GAMMA GT (GGT); Future    3. Gallbladder polyp  , age 81, asymptomatic  3 mm   - US-RUQ; Future, recheck in six month  If becomes symptomatic he will let us know    4. Thrombocytopenia (HCC)  Follow with " hematology      Follow-up:Return in about 6 months (around 1/30/2022), or if symptoms worsen or fail to improve.    This note was created using voice recognition software. There may be unintended errors in spelling, grammar or content.

## 2021-07-30 NOTE — ASSESSMENT & PLAN NOTE
Elevated liver enzymes, elevated GGT, F actin Ab positive.  Hepatitis B and C panel negative. Anti smooth muscle, anti mitochondrial Ab negative  Iron panel unremarkable  Denies excessive ETOH  Used to take ibuprofen for pain, now switched to celebrex  Denies herbal supplement    7/30/21 US liver   1. Echogenic heterogeneous liver, likely hepatocellular disease.  2. Cholelithiasis.  3. Subcentimeter gallbladder polyp (3mm per radiology)        Ref. Range 6/7/2021 07:29   AST(SGOT) Latest Ref Range: 12 - 45 U/L 94 (H)   ALT(SGPT) Latest Ref Range: 2 - 50 U/L 68 (H)   Alkaline Phosphatase Latest Ref Range: 30 - 99 U/L 111 (H)   Total Bilirubin Latest Ref Range: 0.1 - 1.5 mg/dL 1.1   Albumin Latest Ref Range: 3.2 - 4.9 g/dL 3.7      Ref. Range 6/22/2021 07:10   Anti-Mitochondrial Ab Latest Ref Range: 0.0 - 24.9 Units 1.5   Smooth Muscle Abs Latest Ref Range: <1:20  <1:20   F-Actin Ab, IgG Latest Ref Range: 0 - 19 Units 33 (H)      Ref. Range 6/22/2021 07:10   Gamma Gt Latest Ref Range: 7 - 51 U/L 211 (H)   Iron Latest Ref Range: 50 - 180 ug/dL 142   Total Iron Binding Latest Ref Range: 250 - 450 ug/dL 326   % Saturation Latest Ref Range: 15 - 55 % 44   Unsat Iron Binding Latest Ref Range: 110 - 370 ug/dL 184   Alkaline Phosphatase Latest Ref Range: 40 - 120 U/L 102   Bone Fractions Latest Ref Range: 0 - 55 U/L 44   Liver Fractions Latest Ref Range: 0 - 94 U/L 58   Alk Phos Other Calc Latest Units: U/L 0   Ferritin Latest Ref Range: 22.0 - 322.0 ng/mL 170.0   Folate -Folic Acid Latest Ref Range: >4.0 ng/mL 11.1   Immunoglobulin A Latest Ref Range: 68.0 - 408.0 mg/dL 437.0 (H)   Immunoglobulin G Latest Ref Range: 768.0 - 1632.0 mg/dL 1495.0   t-TG IgA Latest Ref Range: 0 - 3 U/mL 2   Vitamin B12 -True Cobalamin Latest Ref Range: 211 - 911 pg/mL 911   Hep B Surface Antibody Quant Latest Ref Range: 0.00 - 10.00 mIU/mL <3.50   Hepatitis B Surface Antigen Latest Ref Range: Non-Reactive  Non-Reactive   Hepatitis B Cors Ab,IgM  Latest Ref Range: Non-Reactive  Non-Reactive   Hepatitis Be Antigen Latest Ref Range: Negative  Negative   Hepatitis BE Antibody Latest Ref Range: Negative  Negative   Hepatitis C Antibody Latest Ref Range: Non-Reactive  Non-Reactive

## 2021-08-13 NOTE — PROGRESS NOTES
08/19/21    Subjective    Chief Complaint:  Follow up from consultation from 7/27 for macrocytosis and thrombocytopenia. B12, folate, retic count and haptoglobin are all normal but protein electrophoresis does show an IgG kappa monoclonal protein. He is 81 years old. He is not anemic. Ca++, BUN and creatinine are normal. Feels well w/o sx's.     HPI:      ROS:    Constitutional: No weight loss  Skin: No rash or jaundice  HENT: No change in eyesight or hearing  Cardiovascular:No chest pain or arrythmia  Respiratory:No cough or SOB  GI:No nausea, vomiting, diarrhea, constipation  :No dysuria or frequency  Musculoskeletal:No bone or joint pain  Neuro:No sx's of neuropathy  Psych: No complaints    PMH:      Allergies   Allergen Reactions   • Ciprofloxacin Hcl Rash     rash       Past Medical History:   Diagnosis Date   • Hyperlipidemia    • Hypertension         Past Surgical History:   Procedure Laterality Date   • EYE SURGERY Left 2019    left glaucoma and cataract surgery 10/2019   • OTHER  2018    left shoulder rotator cuff repair   • MULTIPLE CORONARY ARTERY BYPASS  2018   • LAMINOTOMY  2011    laminectomy, fixation  hardware. MRI compatible        Medications:    Current Outpatient Medications on File Prior to Visit   Medication Sig Dispense Refill   • B Complex-Folic Acid (B COMPLEX-VITAMIN B12 PO)      • celecoxib (CELEBREX) 100 MG Cap Take 1 capsule by mouth 2 times a day. TAKE WITH FOOD     • Ascorbic Acid (VITAMIN C) 500 MG Cap Take 1 tablet by mouth every day.     • lisinopril (PRINIVIL) 5 MG Tab Take 1 tablet by mouth every day. TAKE 1 TABLET BY MOUTH DAILY 90 tablet 3   • atorvastatin (LIPITOR) 80 MG tablet Take 1 tablet by mouth every day. 90 tablet 3   • Magnesium 250 MG Tab Take  by mouth every day.     • Fexofenadine HCl (ALLERGY 24-HR PO) Take  by mouth every day.     • Multiple Vitamins-Minerals (OCUVITE-LUTEIN) Tab Take 1 tablet by mouth every day.     • Cholecalciferol (VITAMIN D3) 125 MCG (5000  "UT) Tab Take  by mouth.     • aspirin 81 MG EC tablet Take 1 Tab by mouth every day. 90 Tab 3     No current facility-administered medications on file prior to visit.       Social History     Tobacco Use   • Smoking status: Former Smoker     Packs/day: 0.25     Years: 20.00     Pack years: 5.00     Types: Cigarettes     Quit date:      Years since quittin.6   • Smokeless tobacco: Never Used   Substance Use Topics   • Alcohol use: Not Currently     Comment: Quit drinking  2021        Family History   Problem Relation Age of Onset   • Other Mother         dementia   • Alcohol abuse Father    • Other Brother         dementia   • Other Sister         Alzheimer's   • Other Sister         Alzheimer's   • Cancer Brother         lung   • Cancer Brother         lung   • Cancer Brother         lung        Objective    Vitals:    /82 (BP Location: Right arm, Patient Position: Sitting, BP Cuff Size: Adult)   Pulse 87   Temp 36.6 °C (97.8 °F) (Temporal)   Resp 16   Ht 1.753 m (5' 9.02\")   Wt 110 kg (241 lb 13.5 oz)   SpO2 95%   BMI 35.70 kg/m²     Physical Exam:    Appears well-developed and well-nourished. No distress.    Head -  Normocephalic .   Eyes - Pupils are equal. Conjunctivae  normal. No scleral icterus.   Ears - normal hearing  Back - no percussion tenderness  Neurological -   Alert and oriented  Skin - Skin is warm and dry. No rash noted. Not diaphoretic. No erythema. No pallor. No jaundice   Psychiatric -  Normal mood and affect.    Labs:  Results for ARMANDO LOWE (MRN [id number])    Ref. Range 2021 15:08   Reticulocyte Count Latest Ref Range: 0.8 - 2.1 % 1.6   Retic, Absolute Latest Ref Range: 0.04 - 0.06 M/uL 0.07 (H)   Results for ARMANDO LOWE (MRN [id number])    Ref. Range 2021 15:08   LDH Total Latest Ref Range: 107 - 266 U/L 271 (H)   Results for ARMANDO LOWE (MRN [id number])    Ref. Range 2021 15:08   Immunoglobulin A Latest Ref Range: 68 - 408 mg/dL 406 "   Immunoglobulin G Latest Ref Range: 768 - 1632 mg/dL 1339   Immunoglobulin M Latest Ref Range: 35 - 263 mg/dL 85   M-spike with an additional slight restriction in the gamma   region.  The monoclonal protein peak accounts for 0.48 g/dL   of the total 1.37 g/dL of protein in the gamma region.  JOHN   pattern shows an IgG type kappa monoclonal protein  Results for ARMANDO LWOE (MRN [id number])    Ref. 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 Thrombocytopenia (HCC)     2. Macrocytosis     3. MGUS (monoclonal gammopathy of unknown significance)  Monoclonal Protein and FLC, Serum    CBC WITH DIFFERENTIAL    Comp Metabolic Panel         Plan:  Discussed the natural history of MGUS. Will not do a BMX unless the protein levels change or develops other CRAB criteria  RTC 3 months with lab prior    Kevin Dorsey M.D.

## 2021-08-25 NOTE — PROGRESS NOTES
"CARDIOLOGY OUTPATIENT FOLLOWUP    PCP: Afua Solorio M.D.    1. Coronary artery disease involving native coronary artery of native heart without angina pectoris    2. Hypertension, essential    3. Dyslipidemia    4. Obstructive sleep apnea syndrome      Kyle Fleming is stable from a cardiovascular standpoint.  I recommend he continue aspirin atorvastatin as well as lisinopril.    Follow up: 1 year    Chief Complaint   Patient presents with   • Coronary Artery Disease     F/V Dx: Coronary artery disease involving native coronary artery of native heart without angina pectoris       History: Kyle Fleming is a 81 y.o. male with history of PCI in 2018, hypertension hyperlipidemia, preserved LV function and sleep apnea presenting for follow-up.  He had Covid last year and had migratory arthralgias which have spontaneously resolved.  He was off atorvastatin for a bit of time due to these pains but has been back on it without complications.  He was diagnosed with MGUS and is under the observation of hematology.    He recently adopted a blue healer puppy and has been very much enjoying his time with her.        ROS:   All other systems reviewed and negative except as per the HPI    PE:  /64 (BP Location: Left arm, Patient Position: Sitting, BP Cuff Size: Adult)   Pulse 63   Resp 14   Ht 1.753 m (5' 9\")   Wt 108 kg (238 lb)   SpO2 97%   BMI 35.15 kg/m²   Gen: no acute distress  HEENT: Symmetric face. Anicteric sclerae. Moist mucus membranes  NECK: No JVD. No lymphadenopathy  CARDIAC: Regular, Normal S1, S2, No murmur  VASCULATURE: carotids are normal bilaterally without bruit  RESP: Clear to auscultation bilaterally  ABD: Soft, non-tender, non-distended  EXT: No edema, no clubbing or cyanosis  SKIN: Warm and dry  NEURO: No gross deficits  PSYCH: Appropriate affect, participates in conversation    The 10-year ASCVD risk score (Pedro DC Jr., et al., 2013) is: 33.4%    Past Medical History:   Diagnosis " Date   • Hyperlipidemia    • Hypertension      Allergies   Allergen Reactions   • Ciprofloxacin Hcl Rash     rash     Outpatient Encounter Medications as of 2021   Medication Sig Dispense Refill   • B Complex-Folic Acid (B COMPLEX-VITAMIN B12 PO)      • celecoxib (CELEBREX) 100 MG Cap Take 1 capsule by mouth 2 times a day. TAKE WITH FOOD     • Ascorbic Acid (VITAMIN C) 500 MG Cap Take 1 tablet by mouth every day.     • lisinopril (PRINIVIL) 5 MG Tab Take 1 tablet by mouth every day. TAKE 1 TABLET BY MOUTH DAILY 90 tablet 3   • atorvastatin (LIPITOR) 80 MG tablet Take 1 tablet by mouth every day. 90 tablet 3   • Magnesium 250 MG Tab Take  by mouth every day.     • Fexofenadine HCl (ALLERGY 24-HR PO) Take  by mouth every day.     • Multiple Vitamins-Minerals (OCUVITE-LUTEIN) Tab Take 1 tablet by mouth every day.     • Cholecalciferol (VITAMIN D3) 125 MCG (5000 UT) Tab Take  by mouth.     • aspirin 81 MG EC tablet Take 1 Tab by mouth every day. 90 Tab 3     No facility-administered encounter medications on file as of 2021.     Social History     Socioeconomic History   • Marital status:      Spouse name: Not on file   • Number of children: Not on file   • Years of education: Not on file   • Highest education level: Not on file   Occupational History   • Not on file   Tobacco Use   • Smoking status: Former Smoker     Packs/day: 0.25     Years: 20.00     Pack years: 5.00     Types: Cigarettes     Quit date:      Years since quittin.6   • Smokeless tobacco: Never Used   Vaping Use   • Vaping Use: Never used   Substance and Sexual Activity   • Alcohol use: Not Currently     Comment: Quit drinking  2021   • Drug use: Never   • Sexual activity: Not Currently     Comment: , retired    Other Topics Concern   • Not on file   Social History Narrative   • Not on file     Social Determinants of Health     Financial Resource Strain: Low Risk    • Difficulty of Paying Living  Expenses: Not hard at all   Food Insecurity: No Food Insecurity   • Worried About Running Out of Food in the Last Year: Never true   • Ran Out of Food in the Last Year: Never true   Transportation Needs: No Transportation Needs   • Lack of Transportation (Medical): No   • Lack of Transportation (Non-Medical): No   Physical Activity: Insufficiently Active   • Days of Exercise per Week: 5 days   • Minutes of Exercise per Session: 10 min   Stress: No Stress Concern Present   • Feeling of Stress : Not at all   Social Connections: Moderately Isolated   • Frequency of Communication with Friends and Family: More than three times a week   • Frequency of Social Gatherings with Friends and Family: Never   • Attends Oriental orthodox Services: Never   • Active Member of Clubs or Organizations: No   • Attends Club or Organization Meetings: Never   • Marital Status:    Intimate Partner Violence:    • Fear of Current or Ex-Partner:    • Emotionally Abused:    • Physically Abused:    • Sexually Abused:        Studies  Lab Results   Component Value Date/Time    CHOLSTRLTOT 159 06/07/2021 07:29 AM    LDL 89 06/07/2021 07:29 AM    HDL 49 06/07/2021 07:29 AM    TRIGLYCERIDE 104 06/07/2021 07:29 AM       Lab Results   Component Value Date/Time    SODIUM 138 06/07/2021 07:29 AM    POTASSIUM 4.7 06/07/2021 07:29 AM    CHLORIDE 105 06/07/2021 07:29 AM    CO2 25 06/07/2021 07:29 AM    GLUCOSE 94 06/07/2021 07:29 AM    BUN 13 06/07/2021 07:29 AM    CREATININE 0.79 06/07/2021 07:29 AM     Lab Results   Component Value Date/Time    ALKPHOSPHAT 111 (H) 06/07/2021 07:29 AM    ASTSGOT 94 (H) 06/07/2021 07:29 AM    ALTSGPT 68 (H) 06/07/2021 07:29 AM    TBILIRUBIN 1.1 06/07/2021 07:29 AM        For this encounter I reviewed the following medical records BMP, Lipid profile and CBC

## 2021-10-01 ENCOUNTER — APPOINTMENT (RX ONLY)
Dept: URBAN - METROPOLITAN AREA CLINIC 31 | Facility: CLINIC | Age: 81
Setting detail: DERMATOLOGY
End: 2021-10-01

## 2021-10-01 DIAGNOSIS — L57.0 ACTINIC KERATOSIS: ICD-10-CM

## 2021-10-01 DIAGNOSIS — L82.1 OTHER SEBORRHEIC KERATOSIS: ICD-10-CM

## 2021-10-01 DIAGNOSIS — Z85.828 PERSONAL HISTORY OF OTHER MALIGNANT NEOPLASM OF SKIN: ICD-10-CM

## 2021-10-01 DIAGNOSIS — D18.0 HEMANGIOMA: ICD-10-CM

## 2021-10-01 DIAGNOSIS — L81.4 OTHER MELANIN HYPERPIGMENTATION: ICD-10-CM

## 2021-10-01 PROBLEM — D18.01 HEMANGIOMA OF SKIN AND SUBCUTANEOUS TISSUE: Status: ACTIVE | Noted: 2021-10-01

## 2021-10-01 PROCEDURE — ? COUNSELING

## 2021-10-01 PROCEDURE — ? LIQUID NITROGEN

## 2021-10-01 PROCEDURE — 17000 DESTRUCT PREMALG LESION: CPT

## 2021-10-01 PROCEDURE — 99213 OFFICE O/P EST LOW 20 MIN: CPT | Mod: 25

## 2021-10-01 ASSESSMENT — LOCATION DETAILED DESCRIPTION DERM
LOCATION DETAILED: RIGHT INFERIOR TEMPLE
LOCATION DETAILED: LEFT INFERIOR CENTRAL MALAR CHEEK
LOCATION DETAILED: LEFT CENTRAL MALAR CHEEK
LOCATION DETAILED: LEFT SUPERIOR LATERAL MALAR CHEEK
LOCATION DETAILED: LEFT SUPERIOR HELIX

## 2021-10-01 ASSESSMENT — LOCATION ZONE DERM
LOCATION ZONE: EAR
LOCATION ZONE: FACE

## 2021-10-01 ASSESSMENT — LOCATION SIMPLE DESCRIPTION DERM
LOCATION SIMPLE: LEFT EAR
LOCATION SIMPLE: RIGHT TEMPLE
LOCATION SIMPLE: LEFT CHEEK

## 2021-10-01 NOTE — PROCEDURE: MIPS QUALITY
Quality 226: Preventive Care And Screening: Tobacco Use: Screening And Cessation Intervention: Patient screened for tobacco use and is an ex/non-smoker
Quality 110: Preventive Care And Screening: Influenza Immunization: Influenza Immunization Administered during Influenza season
Quality 431: Preventive Care And Screening: Unhealthy Alcohol Use - Screening: Patient not identified as an unhealthy alcohol user when screened for unhealthy alcohol use using a systematic screening method
Detail Level: Detailed
Quality 130: Documentation Of Current Medications In The Medical Record: Current Medications Documented
Quality 111:Pneumonia Vaccination Status For Older Adults: Pneumococcal Vaccination Previously Received
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
## 2021-11-11 NOTE — PROGRESS NOTES
11/18/21    Subjective    Chief Complaint:  Follow up MGUS, macrocytosis and thrombocytopenia    HPI:  81 male seen initially in consultation 7/27/21 for macrocytosis and thrombocytopenia. SPEP showed an IgG kappa monoclonal protein. He is not anemic and the thrombocytopenia is mild. Feels well. May need a right total knee replacement in the near future.     ROS:    Constitutional: No weight loss  Skin: No rash or jaundice  HENT: No change in eyesight or poor hearing  Cardiovascular:No chest pain or arrythmia  Respiratory:No cough or SOB  GI:No nausea, vomiting, diarrhea, constipation  :No dysuria or frequency  Musculoskeletal:No bone or joint pain except right knee  Neuro:No sx's of neuropathy  Psych: No complaints    PMH:      Allergies   Allergen Reactions   • Ciprofloxacin Hcl Rash     rash       Past Medical History:   Diagnosis Date   • Hyperlipidemia    • Hypertension         Past Surgical History:   Procedure Laterality Date   • EYE SURGERY Left 2019    left glaucoma and cataract surgery 10/2019   • OTHER  2018    left shoulder rotator cuff repair   • MULTIPLE CORONARY ARTERY BYPASS  2018   • LAMINOTOMY  2011    laminectomy, fixation  hardware. MRI compatible        Medications:    Current Outpatient Medications on File Prior to Visit   Medication Sig Dispense Refill   • B Complex-Folic Acid (B COMPLEX-VITAMIN B12 PO)      • celecoxib (CELEBREX) 100 MG Cap Take 1 capsule by mouth 2 times a day. TAKE WITH FOOD     • Ascorbic Acid (VITAMIN C) 500 MG Cap Take 1 tablet by mouth every day.     • lisinopril (PRINIVIL) 5 MG Tab Take 1 tablet by mouth every day. TAKE 1 TABLET BY MOUTH DAILY 90 tablet 3   • atorvastatin (LIPITOR) 80 MG tablet Take 1 tablet by mouth every day. 90 tablet 3   • Magnesium 250 MG Tab Take  by mouth every day.     • Fexofenadine HCl (ALLERGY 24-HR PO) Take  by mouth every day.     • Multiple Vitamins-Minerals (OCUVITE-LUTEIN) Tab Take 1 tablet by mouth every day.     • Cholecalciferol  "(VITAMIN D3) 125 MCG (5000 UT) Tab Take  by mouth.     • aspirin 81 MG EC tablet Take 1 Tab by mouth every day. 90 Tab 3     No current facility-administered medications on file prior to visit.       Social History     Tobacco Use   • Smoking status: Former Smoker     Packs/day: 0.25     Years: 20.00     Pack years: 5.00     Types: Cigarettes     Quit date:      Years since quittin.9   • Smokeless tobacco: Never Used   Substance Use Topics   • Alcohol use: Yes        Family History   Problem Relation Age of Onset   • Other Mother         dementia   • Alcohol abuse Father    • Other Brother         dementia   • Other Sister         Alzheimer's   • Other Sister         Alzheimer's   • Cancer Brother         lung   • Cancer Brother         lung   • Cancer Brother         lung        Objective    Vitals:    /78   Pulse 82   Temp 37 °C (98.6 °F) (Temporal)   Resp 16   Ht 1.753 m (5' 9.02\")   Wt 112 kg (246 lb 11.1 oz)   SpO2 97%   BMI 36.41 kg/m²     Physical Exam:    Appears well-developed and well-nourished. No distress.    Head -  Normocephalic .   Eyes - Pupils are equal. Conjunctivae normal. No scleral icterus.   Ears - Diminished hearing  Neurological -   Alert and oriented.  Skin - Skin is warm and dry. No rash noted. Not diaphoretic. No erythema. No pallor. No jaundice   Psychiatric -  Normal mood and affect.    Labs:      Assessment  Results for ARMANDO LOWE (MRN [id number])    Ref. Range 2021 07:29 2021 15:08 2021 07:16   WBC Latest Ref Range: 4.8 - 10.8 K/uL 5.3 5.9 5.3   RBC Latest Ref Range: 4.70 - 6.10 M/uL 4.57 (L) 4.49 (L) 4.63 (L)   Hemoglobin Latest Ref Range: 14.0 - 18.0 g/dL 15.3 14.9 15.4   Hematocrit Latest Ref Range: 42.0 - 52.0 % 47.5 44.7 45.7   MCV Latest Ref Range: 81.4 - 97.8 fL 103.9 (H) 99.6 (H) 98.7 (H)   MCH Latest Ref Range: 27.0 - 33.0 pg 33.5 (H) 33.2 (H) 33.3 (H)   MCHC Latest Ref Range: 33.7 - 35.3 g/dL 32.2 (L) 33.3 (L) 33.7   RDW Latest Ref Range: " 35.9 - 50.0 fL 55.2 (H) 49.3 48.7   Platelet Count Latest Ref Range: 164 - 446 K/uL 124 (L) 131 (L) 130 (L)   Results for ARMANDO LOWE (MRN [id number])    Ref. Range 7/27/2021 15:08 11/9/2021 07:16   Free Kappa Light Chains Latest Ref Range: 3.30 - 19.40 mg/L 31.53 (H) 29.73 (H)   Free Lambda Light Chains Latest Ref Range: 5.71 - 26.30 mg/L 22.32 23.38   Kappa-Lambda Ratio Latest Ref Range: 0.26 - 1.65  1.41 1.27   Results for ARMANDO LOWE (MRN [id number])    Ref. Range 6/22/2021 07:10 7/27/2021 15:08 11/9/2021 07:16   Immunoglobulin A Latest Ref Range: 68 - 408 mg/dL 437.0 (H) 406 402   Immunoglobulin G Latest Ref Range: 768 - 1632 mg/dL 1495.0 1339 1333   Immunoglobulin M Latest Ref Range: 35 - 263 mg/dL  85 83     Imp:    Visit Diagnosis:    1. Thrombocytopenia (HCC)     2. Macrocytosis     3. MGUS (monoclonal gammopathy of unknown significance)           Plan:  Would monitor labs once a year and send back if something changes    Kevin Dorsey M.D.
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
## 2021-12-06 NOTE — PATIENT INSTRUCTIONS
Burn Care, Adult  A burn is an injury to the skin or the tissues under the skin. There are three types of burns:  · First degree. These burns may cause the skin to be red and slightly swollen.  · Second degree. These burns are very painful and cause the skin to be very red. The skin may also leak fluid, look shiny, and develop blisters.  · Third degree. These burns cause permanent damage. They either turn the skin white or black and make it look charred, dry, and leathery.  Taking care of your burn properly can help to prevent pain and infection. It can also help the burn to heal more quickly.  What are the risks?  Complications from burns include:  · Damage to the skin.  · Reduced blood flow near the injury.  · Dead tissue.  · Scarring.  · Problems with movement, if the burn happened near a joint or on the hands or feet.  Severe burns can lead to problems that affect the whole body, such as:  · Fluid loss.  · Less blood circulating in the body.  · Inability to maintain a normal core body temperature (thermoregulation).  · Infection.  · Shock.  · Problems breathing.  How to care for a first-degree burn  Right after a burn:  · Rinse or soak the burn under cool water until the pain stops. Do not put ice on your burn. This can cause more damage.  · Lightly cover the burn with a sterile cloth (dressing).  Burn care  · Follow instructions from your health care provider about:  ? How to clean and take care of the burn.  ? When to change and remove the dressing.  · Check your burn every day for signs of infection. Check for:  ? More redness, swelling, or pain.  ? Warmth.  ? Pus or a bad smell.  Medicine  · Take over-the-counter and prescription medicines only as told by your health care provider.  · If you were prescribed antibiotic medicine, take or apply it as told by your health care provider. Do not stop using the antibiotic even if your condition improves.  General instructions  · To prevent infection, do not put  butter, oil, or other home remedies on your burn.  · Do not rub your burn, even when you are cleaning it.  · Protect your burn from the sun.  How to care for a second-degree burn  Right after a burn:  · Rinse or soak the burn under cool water. Do this for several minutes. Do not put ice on your burn. This can cause more damage.  · Lightly cover the burn with a sterile cloth (dressing).  Burn care  · Raise (elevate) the injured area above the level of your heart while sitting or lying down.  · Follow instructions from your health care provider about:  ? How to clean and take care of the burn.  ? When to change and remove the dressing.  · Check your burn every day for signs of infection. Check for:  ? More redness, swelling, or pain.  ? Warmth.  ? Pus or a bad smell.  Medicine    · Take over-the-counter and prescription medicines only as told by your health care provider.  · If you were prescribed antibiotic medicine, take or apply it as told by your health care provider. Do not stop using the antibiotic even if your condition improves.  General instructions  · To prevent infection:  ? Do not put butter, oil, or other home remedies on the burn.  ? Do not scratch or pick at the burn.  ? Do not break any blisters.  ? Do not peel skin.  · Do not rub your burn, even when you are cleaning it.  · Protect your burn from the sun.  How to care for a third-degree burn  Right after a burn:  · Lightly cover the burn with gauze.  · Seek immediate medical attention.  Burn care  · Raise (elevate) the injured area above the level of your heart while sitting or lying down.  · Drink enough fluid to keep your urine clear or pale yellow.  · Rest as told by your health care provider. Do not participate in sports or other physical activities until your health care provider approves.  · Follow instructions from your health care provider about:  ? How to clean and take care of the burn.  ? When to change and remove the dressing.  · Check  your burn every day for signs of infection. Check for:  ? More redness, swelling, or pain.  ? Warmth.  ? Pus or a bad smell.  Medicine  · Take over-the-counter and prescription medicines only as told by your health care provider.  · If you were prescribed antibiotic medicine, take or apply it as told by your health care provider. Do not stop using the antibiotic even if your condition improves.  General instructions  · To prevent infection:  ? Do not put butter, oil, or other home remedies on the burn.  ? Do not scratch or pick at the burn.  ? Do not break any blisters.  ? Do not peel skin.  ? Do not rub your burn, even when you are cleaning it.  · Protect your burn from the sun.  · Keep all follow-up visits as told by your health care provider. This is important.  Contact a health care provider if:  · Your condition does not improve.  · Your condition gets worse.  · You have a fever.  · Your burn changes in appearance or develops black or red spots.  · Your burn feels warm to the touch.  · Your pain is not controlled with medicine.  Get help right away if:  · You have redness, swelling, or pain at the site of the burn.  · You have fluid, blood, or pus coming from your burn.  · You have red streaks near the burn.  · You have severe pain.  This information is not intended to replace advice given to you by your health care provider. Make sure you discuss any questions you have with your health care provider.  Document Released: 12/18/2006 Document Revised: 11/30/2018 Document Reviewed: 06/06/2017  [employer] Patient Education © 2020 [employer]

## 2022-01-01 ENCOUNTER — APPOINTMENT (OUTPATIENT)
Dept: MEDICAL GROUP | Facility: MEDICAL CENTER | Age: 82
End: 2022-01-01
Payer: MEDICARE

## 2022-01-01 DIAGNOSIS — I25.10 CORONARY ARTERY DISEASE INVOLVING NATIVE CORONARY ARTERY OF NATIVE HEART WITHOUT ANGINA PECTORIS: ICD-10-CM

## 2022-01-01 DIAGNOSIS — I10 ESSENTIAL HYPERTENSION: ICD-10-CM

## 2022-01-01 RX ORDER — ATORVASTATIN CALCIUM 80 MG/1
TABLET, FILM COATED ORAL
Qty: 90 TABLET | Refills: 3 | Status: SHIPPED | OUTPATIENT
Start: 2022-01-01 | End: 2023-08-17

## 2022-01-01 RX ORDER — LISINOPRIL 5 MG/1
TABLET ORAL
Qty: 90 TABLET | Refills: 3 | Status: SHIPPED | OUTPATIENT
Start: 2022-01-01 | End: 2023-08-17

## 2022-03-05 ENCOUNTER — PATIENT MESSAGE (OUTPATIENT)
Dept: CARDIOLOGY | Facility: MEDICAL CENTER | Age: 82
End: 2022-03-05
Payer: MEDICARE

## 2022-07-07 NOTE — PROCEDURE: MIPS QUALITY
Quality 111:Pneumonia Vaccination Status For Older Adults: Pneumococcal Vaccination Previously Received
Detail Level: Detailed
Quality 130: Documentation Of Current Medications In The Medical Record: Current Medications Documented
Quality 431: Preventive Care And Screening: Unhealthy Alcohol Use - Screening: Patient screened for unhealthy alcohol use using a single question and scores less than 2 times per year
Quality 226: Preventive Care And Screening: Tobacco Use: Screening And Cessation Intervention: Patient screened for tobacco use and is an ex/non-smoker
Yes

## 2022-11-08 ENCOUNTER — PATIENT MESSAGE (OUTPATIENT)
Dept: HEALTH INFORMATION MANAGEMENT | Facility: OTHER | Age: 82
End: 2022-11-08